# Patient Record
Sex: FEMALE | Race: WHITE | Employment: FULL TIME | URBAN - METROPOLITAN AREA
[De-identification: names, ages, dates, MRNs, and addresses within clinical notes are randomized per-mention and may not be internally consistent; named-entity substitution may affect disease eponyms.]

---

## 2021-11-19 LAB
ANTIBODY SCREEN, EXTERNAL: NEGATIVE
CHLAMYDIA, EXTERNAL: NEGATIVE
HBSAG, EXTERNAL: NON REACTIVE
HEPATITIS C AB,   EXT: NEGATIVE
HIV, EXTERNAL: NON REACTIVE
N. GONORRHEA, EXTERNAL: NEGATIVE
RUBELLA, EXTERNAL: NORMAL
T. PALLIDUM, EXTERNAL: NON REACTIVE
TYPE, ABO & RH, EXTERNAL: NORMAL

## 2022-04-06 ENCOUNTER — HOSPITAL ENCOUNTER (EMERGENCY)
Age: 32
Discharge: HOME | End: 2022-04-06
Attending: OBSTETRICS & GYNECOLOGY | Admitting: STUDENT IN AN ORGANIZED HEALTH CARE EDUCATION/TRAINING PROGRAM
Payer: COMMERCIAL

## 2022-04-06 VITALS
BODY MASS INDEX: 46.07 KG/M2 | DIASTOLIC BLOOD PRESSURE: 82 MMHG | SYSTOLIC BLOOD PRESSURE: 120 MMHG | OXYGEN SATURATION: 97 % | HEART RATE: 116 BPM | TEMPERATURE: 98.2 F | HEIGHT: 63 IN | RESPIRATION RATE: 20 BRPM | WEIGHT: 260 LBS

## 2022-04-06 LAB
A1 MICROGLOB PLACENTAL VAG QL: NEGATIVE
APPEARANCE UR: CLEAR
BACTERIA URNS QL MICRO: ABNORMAL /HPF
BILIRUB UR QL: NEGATIVE
COLOR UR: ABNORMAL
CONTROL LINE PRESENT?: NORMAL
EPITH CASTS URNS QL MICRO: ABNORMAL /LPF
EXPIRATION DATE: NORMAL
GLUCOSE UR STRIP.AUTO-MCNC: NEGATIVE MG/DL
HGB UR QL STRIP: NEGATIVE
HYALINE CASTS URNS QL MICRO: ABNORMAL /LPF (ref 0–5)
INTERNAL NEGATIVE CONTROL: NORMAL
KETONES UR QL STRIP.AUTO: NEGATIVE MG/DL
KIT LOT NO.: NORMAL
LEUKOCYTE ESTERASE UR QL STRIP.AUTO: NEGATIVE
NITRITE UR QL STRIP.AUTO: NEGATIVE
PH UR STRIP: 6 [PH] (ref 5–8)
PROT UR STRIP-MCNC: NEGATIVE MG/DL
RBC #/AREA URNS HPF: ABNORMAL /HPF (ref 0–5)
SP GR UR REFRACTOMETRY: 1.02 (ref 1–1.03)
UA: UC IF INDICATED,UAUC: ABNORMAL
UROBILINOGEN UR QL STRIP.AUTO: 0.2 EU/DL (ref 0.2–1)
WBC URNS QL MICRO: ABNORMAL /HPF (ref 0–4)

## 2022-04-06 PROCEDURE — 99285 EMERGENCY DEPT VISIT HI MDM: CPT

## 2022-04-06 PROCEDURE — 59025 FETAL NON-STRESS TEST: CPT

## 2022-04-06 PROCEDURE — 74011250636 HC RX REV CODE- 250/636: Performed by: STUDENT IN AN ORGANIZED HEALTH CARE EDUCATION/TRAINING PROGRAM

## 2022-04-06 PROCEDURE — 84112 EVAL AMNIOTIC FLUID PROTEIN: CPT | Performed by: STUDENT IN AN ORGANIZED HEALTH CARE EDUCATION/TRAINING PROGRAM

## 2022-04-06 PROCEDURE — 96360 HYDRATION IV INFUSION INIT: CPT

## 2022-04-06 PROCEDURE — 81001 URINALYSIS AUTO W/SCOPE: CPT

## 2022-04-06 RX ORDER — GUAIFENESIN 100 MG/5ML
81 LIQUID (ML) ORAL DAILY
COMMUNITY
End: 2022-05-01

## 2022-04-06 RX ORDER — PROMETHAZINE HYDROCHLORIDE 12.5 MG/1
12.5 TABLET ORAL
COMMUNITY
End: 2022-05-01

## 2022-04-06 RX ORDER — BUSPIRONE HYDROCHLORIDE 10 MG/1
5 TABLET ORAL 2 TIMES DAILY
COMMUNITY

## 2022-04-06 RX ORDER — ONDANSETRON 4 MG/1
4 TABLET, FILM COATED ORAL
COMMUNITY

## 2022-04-06 RX ORDER — LEVOTHYROXINE SODIUM 75 UG/1
75 TABLET ORAL
COMMUNITY

## 2022-04-06 RX ADMIN — SODIUM CHLORIDE, POTASSIUM CHLORIDE, SODIUM LACTATE AND CALCIUM CHLORIDE 1000 ML: 600; 310; 30; 20 INJECTION, SOLUTION INTRAVENOUS at 14:24

## 2022-04-06 NOTE — PROGRESS NOTES
Pt arrived from home with c/o of contractions starting around 11am; states she has some leaking but is unsure if it is urine or bag of water; allergy to keflex, tramadol, penicillin, sulfa, bactrim; G1; [facility] 5/17/2022

## 2022-04-06 NOTE — DISCHARGE INSTRUCTIONS
Patient Education   Patient Education        Weeks 34 to 39 of Your Pregnancy: Care Instructions  Overview     By now, your baby and your belly have grown quite large. It's almost time to give birth! Your baby's lungs are almost ready to breathe air. The skull bones are firm enough to protect your baby's head, but soft enough to move down through the birth canal.  You may be feeling excited and happy at times--but also anxious or scared. You might wonder how you'll know if you're in labor or what to expect during labor. Try to be open and flexible in your expectations of the birth. Because each birth is different, there's no way to know exactly what childbirth will be like for you. Talk to your doctor or midwife about any concerns you have. If you haven't already had the Tdap shot during this pregnancy, talk to your doctor about getting it. It will help protect your  against pertussis infection. In the 36th week, you'll probably have a test for group B streptococcus (GBS). GBS is a common type of bacteria that can live in the vagina and rectum. It can make your baby sick after birth. If you test positive, you will get antibiotics during labor. The medicine will help keep your baby from getting the bacteria. Follow-up care is a key part of your treatment and safety. Be sure to make and go to all appointments, and call your doctor if you are having problems. It's also a good idea to know your test results and keep a list of the medicines you take. How can you care for yourself at home? Learn about pain relief choices  · Pain is different for everyone. Talk with your doctor about your feelings about pain. · You can choose from several types of pain relief. These include medicine, breathing techniques, and comfort measures. You can use more than one option. · If you choose to have pain medicine during labor, talk to your doctor about your options.  Some medicines lower anxiety and help with some of the pain. Others make your lower body numb so that you won't feel pain. · Be sure to tell your doctor about your pain medicine choice before you start labor or very early in your labor. You may be able to change your mind as labor progresses. Labor and delivery  · The first stage of labor has three parts: early, active, and transition. ? It's common to have early labor at home. You can stay busy or rest, eat light snacks, drink clear fluids, and start counting contractions. ? When talking during a contraction gets hard, you may be moving to active labor. During active labor, you should head for the hospital if you aren't there already. ? You are in active labor when contractions come every 3 to 4 minutes and last about 60 seconds. Your cervix is opening more rapidly. ? If your water breaks, contractions will come faster and stronger. ? During transition, your cervix is stretching, and contractions are coming more rapidly. ? You may want to push, but your cervix might not be ready. Your doctor will tell you when to push. · The second stage starts when your cervix is completely opened and you are ready to push. ? Contractions are very strong to push the baby down the birth canal.  ? You will probably feel the urge to push. You may feel like you need to have a bowel movement. ? You may be coached to push with contractions. These contractions will be very strong, but you won't have them as often. You can get a little rest between contractions. ? One last push, and your baby is born. · The third stage is when a few more contractions push out the placenta. This may take 30 minutes or less. Where can you learn more? Go to http://www.gray.com/  Enter B912 in the search box to learn more about \"Weeks 34 to 36 of Your Pregnancy: Care Instructions. \"  Current as of: June 16, 2021               Content Version: 13.2  © 7329-0112 Healthwise, Incorporated.    Care instructions adapted under license by [employer] (which disclaims liability or warranty for this information). If you have questions about a medical condition or this instruction, always ask your healthcare professional. [address] any warranty or liability for your use of this information. Li Peters Contractions: Care Instructions  Your Care Instructions     Natrona Mcdaniel contractions prepare your uterus for labor. Think of them as a \"warm-up\" exercise that your body does. You may begin to feel them between the 28th and 30th weeks of your pregnancy. But they start as early as the 20th week. Natrona Mcdaniel contractions usually occur more often during the ninth month. They may go away when you are active and return when you rest. These contractions are like mild contractions of true labor, but they occur less often. (You feel fewer than 8 in an hour.) They don't cause your cervix to open. It may be hard for you to tell the difference between Jacelyn [place] contractions and true labor, especially in your first pregnancy. Follow-up care is a key part of your treatment and safety. Be sure to make and go to all appointments, and call your doctor if you are having problems. It's also a good idea to know your test results and keep a list of the medicines you take. How can you care for yourself at home? · Try a warm bath to help relieve muscle tension and reduce pain. · Change positions every 30 minutes. Take breaks if you must sit for a long time. Get up and walk around. · Drink plenty of water. · Taking short walks may help you feel better. Your doctor needs to check any contractions that are getting stronger or closer together. Where can you learn more? Go to http://www.gray.com/  Enter Z402 in the search box to learn more about \"James Mcdaniel Contractions: Care Instructions. \"  Current as of: June 16, 2021               Content Version: 13.2  © 3336-5124 [employer]. Care instructions adapted under license by [employer] (which disclaims liability or warranty for this information). If you have questions about a medical condition or this instruction, always ask your healthcare professional. [address] any warranty or liability for your use of this information.

## 2022-04-06 NOTE — PROGRESS NOTES
1404: Dr. Serina Hudson at bedside. Strip reviewed. SVE performed- closed/thick/high. Plan to PO/IV hydrate and send UA. BP elevated 150/70, - appears anxiety related. Will continue to monitor. Amnisure negative. 1415: 128/79, .    1525: UA reviewed, OK to discharge home per Dr. Serina Hudson. 1540: I have reviewed discharge instructions with the patient. The patient verbalized understanding. Pt to discharge in NAD with AVS and education folder in hand.
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
## 2022-04-22 ENCOUNTER — HOSPITAL ENCOUNTER (OUTPATIENT)
Age: 32
Setting detail: OBSERVATION
Discharge: HOME | End: 2022-04-24
Attending: OBSTETRICS & GYNECOLOGY | Admitting: OBSTETRICS & GYNECOLOGY
Payer: OTHER GOVERNMENT

## 2022-04-22 PROBLEM — O16.9 HYPERTENSION AFFECTING PREGNANCY: Status: ACTIVE | Noted: 2022-04-22

## 2022-04-22 LAB
ALBUMIN SERPL-MCNC: 2.6 G/DL (ref 3.5–5)
ALBUMIN/GLOB SERPL: 0.6 {RATIO} (ref 1.1–2.2)
ALP SERPL-CCNC: 120 U/L (ref 45–117)
ALT SERPL-CCNC: 13 U/L (ref 12–78)
ANION GAP SERPL CALC-SCNC: 8 MMOL/L (ref 5–15)
APPEARANCE UR: CLEAR
AST SERPL-CCNC: 10 U/L (ref 15–37)
BASOPHILS # BLD: 0 K/UL (ref 0–0.1)
BASOPHILS NFR BLD: 0 % (ref 0–1)
BILIRUB SERPL-MCNC: 0.3 MG/DL (ref 0.2–1)
BILIRUB UR QL: NEGATIVE
BUN SERPL-MCNC: 7 MG/DL (ref 6–20)
BUN/CREAT SERPL: 13 (ref 12–20)
CALCIUM SERPL-MCNC: 9.5 MG/DL (ref 8.5–10.1)
CHLORIDE SERPL-SCNC: 106 MMOL/L (ref 97–108)
CO2 SERPL-SCNC: 23 MMOL/L (ref 21–32)
COLOR UR: NORMAL
CREAT SERPL-MCNC: 0.56 MG/DL (ref 0.55–1.02)
CREAT UR-MCNC: 35.2 MG/DL
DIFFERENTIAL METHOD BLD: ABNORMAL
EOSINOPHIL # BLD: 0 K/UL (ref 0–0.4)
EOSINOPHIL NFR BLD: 0 % (ref 0–7)
ERYTHROCYTE [DISTWIDTH] IN BLOOD BY AUTOMATED COUNT: 13.9 % (ref 11.5–14.5)
GLOBULIN SER CALC-MCNC: 4.4 G/DL (ref 2–4)
GLUCOSE SERPL-MCNC: 83 MG/DL (ref 65–100)
GLUCOSE UR STRIP.AUTO-MCNC: NEGATIVE MG/DL
GRBS, EXTERNAL: NEGATIVE
HCT VFR BLD AUTO: 35.7 % (ref 35–47)
HGB BLD-MCNC: 11.5 G/DL (ref 11.5–16)
HGB UR QL STRIP: NEGATIVE
IMM GRANULOCYTES # BLD AUTO: 0.1 K/UL (ref 0–0.04)
IMM GRANULOCYTES NFR BLD AUTO: 1 % (ref 0–0.5)
KETONES UR QL STRIP.AUTO: NEGATIVE MG/DL
LEUKOCYTE ESTERASE UR QL STRIP.AUTO: NEGATIVE
LYMPHOCYTES # BLD: 1.7 K/UL (ref 0.8–3.5)
LYMPHOCYTES NFR BLD: 11 % (ref 12–49)
MCH RBC QN AUTO: 25.2 PG (ref 26–34)
MCHC RBC AUTO-ENTMCNC: 32.2 G/DL (ref 30–36.5)
MCV RBC AUTO: 78.3 FL (ref 80–99)
MONOCYTES # BLD: 1.1 K/UL (ref 0–1)
MONOCYTES NFR BLD: 7 % (ref 5–13)
NEUTS SEG # BLD: 12.6 K/UL (ref 1.8–8)
NEUTS SEG NFR BLD: 81 % (ref 32–75)
NITRITE UR QL STRIP.AUTO: NEGATIVE
NRBC # BLD: 0 K/UL (ref 0–0.01)
NRBC BLD-RTO: 0 PER 100 WBC
PH UR STRIP: 7 [PH] (ref 5–8)
PLATELET # BLD AUTO: 367 K/UL (ref 150–400)
PMV BLD AUTO: 10.4 FL (ref 8.9–12.9)
POTASSIUM SERPL-SCNC: 3.9 MMOL/L (ref 3.5–5.1)
PROT SERPL-MCNC: 7 G/DL (ref 6.4–8.2)
PROT UR STRIP-MCNC: NEGATIVE MG/DL
PROT UR-MCNC: 11 MG/DL (ref 0–11.9)
PROT/CREAT UR-RTO: 0.3
RBC # BLD AUTO: 4.56 M/UL (ref 3.8–5.2)
SODIUM SERPL-SCNC: 137 MMOL/L (ref 136–145)
SP GR UR REFRACTOMETRY: 1.01 (ref 1–1.03)
UROBILINOGEN UR QL STRIP.AUTO: 0.2 EU/DL (ref 0.2–1)
WBC # BLD AUTO: 15.6 K/UL (ref 3.6–11)

## 2022-04-22 PROCEDURE — G0378 HOSPITAL OBSERVATION PER HR: HCPCS

## 2022-04-22 PROCEDURE — 74011250636 HC RX REV CODE- 250/636: Performed by: OBSTETRICS & GYNECOLOGY

## 2022-04-22 PROCEDURE — 74011250637 HC RX REV CODE- 250/637: Performed by: OBSTETRICS & GYNECOLOGY

## 2022-04-22 PROCEDURE — 84156 ASSAY OF PROTEIN URINE: CPT

## 2022-04-22 PROCEDURE — 96361 HYDRATE IV INFUSION ADD-ON: CPT

## 2022-04-22 PROCEDURE — 85025 COMPLETE CBC W/AUTO DIFF WBC: CPT

## 2022-04-22 PROCEDURE — 96360 HYDRATION IV INFUSION INIT: CPT

## 2022-04-22 PROCEDURE — 80053 COMPREHEN METABOLIC PANEL: CPT

## 2022-04-22 PROCEDURE — 99285 EMERGENCY DEPT VISIT HI MDM: CPT

## 2022-04-22 PROCEDURE — 74011250637 HC RX REV CODE- 250/637

## 2022-04-22 PROCEDURE — 65410000002 HC RM PRIVATE OB

## 2022-04-22 PROCEDURE — 36415 COLL VENOUS BLD VENIPUNCTURE: CPT

## 2022-04-22 PROCEDURE — 81003 URINALYSIS AUTO W/O SCOPE: CPT

## 2022-04-22 RX ORDER — ACETAMINOPHEN 325 MG/1
TABLET ORAL
Status: COMPLETED
Start: 2022-04-22 | End: 2022-04-22

## 2022-04-22 RX ORDER — ONDANSETRON 4 MG/1
4 TABLET, FILM COATED ORAL
Status: DISCONTINUED | OUTPATIENT
Start: 2022-04-22 | End: 2022-04-24 | Stop reason: HOSPADM

## 2022-04-22 RX ORDER — FOLIC ACID/MULTIVIT,IRON,MINER 0.4MG-18MG
1 TABLET ORAL DAILY
Status: DISCONTINUED | OUTPATIENT
Start: 2022-04-23 | End: 2022-04-24 | Stop reason: HOSPADM

## 2022-04-22 RX ORDER — BUSPIRONE HYDROCHLORIDE 5 MG/1
5 TABLET ORAL 2 TIMES DAILY
Status: DISCONTINUED | OUTPATIENT
Start: 2022-04-22 | End: 2022-04-24 | Stop reason: HOSPADM

## 2022-04-22 RX ORDER — ACETAMINOPHEN 325 MG/1
650 TABLET ORAL
Status: DISCONTINUED | OUTPATIENT
Start: 2022-04-22 | End: 2022-04-24 | Stop reason: HOSPADM

## 2022-04-22 RX ORDER — PROMETHAZINE HYDROCHLORIDE 25 MG/1
12.5 TABLET ORAL
Status: DISCONTINUED | OUTPATIENT
Start: 2022-04-22 | End: 2022-04-24 | Stop reason: HOSPADM

## 2022-04-22 RX ORDER — SODIUM CHLORIDE, SODIUM LACTATE, POTASSIUM CHLORIDE, CALCIUM CHLORIDE 600; 310; 30; 20 MG/100ML; MG/100ML; MG/100ML; MG/100ML
125 INJECTION, SOLUTION INTRAVENOUS CONTINUOUS
Status: DISCONTINUED | OUTPATIENT
Start: 2022-04-22 | End: 2022-04-24 | Stop reason: HOSPADM

## 2022-04-22 RX ADMIN — SODIUM CHLORIDE, POTASSIUM CHLORIDE, SODIUM LACTATE AND CALCIUM CHLORIDE 125 ML/HR: 600; 310; 30; 20 INJECTION, SOLUTION INTRAVENOUS at 14:04

## 2022-04-22 RX ADMIN — ACETAMINOPHEN 325MG 650 MG: 325 TABLET ORAL at 12:48

## 2022-04-22 RX ADMIN — ACETAMINOPHEN 325MG 650 MG: 325 TABLET ORAL at 18:32

## 2022-04-22 RX ADMIN — BUSPIRONE HYDROCHLORIDE 5 MG: 5 TABLET ORAL at 18:32

## 2022-04-22 RX ADMIN — SODIUM CHLORIDE, POTASSIUM CHLORIDE, SODIUM LACTATE AND CALCIUM CHLORIDE 125 ML/HR: 600; 310; 30; 20 INJECTION, SOLUTION INTRAVENOUS at 21:52

## 2022-04-22 RX ADMIN — SODIUM CHLORIDE, POTASSIUM CHLORIDE, SODIUM LACTATE AND CALCIUM CHLORIDE 1000 ML: 600; 310; 30; 20 INJECTION, SOLUTION INTRAVENOUS at 12:49

## 2022-04-22 NOTE — H&P
History & Physical    Name: Elieser Arias MRN: [id number]  SSN: [id number]    YOB: 1990  Age: 36 y.o. Sex: female      Subjective:     Reason for Admission:  Pregnancy and Hypertension    History of Present Illness: Ms. Gerald Abebe is a 36 y.o.  female with an estimated gestational age of 43w3d with Estimated Date of Delivery: 22. Patient presented to Dr. Diaz [facility] office this morning with complaints of BH contractions. She was noted to have a /100. US- vertex, BPP 6/8- 2 off for no fetal breathing, DANIEL=5.2. She had a mild headache this morning. That has since resolved. She denies RUB or abdominal pain. She reports active FM. She was sent to L&D to rule out pre-eclampsia. Pregnancy c/b:  1. FETAL LEFT PELVIC KIDNEY- plan for u/s after delivery,, torch (-)     2. Fundal accessory lobe seen at 20w    3. Asthma- med infrequently used    4. Depressive disorder-  better w/ txing her hypothyroidism, would like to start sertraline immediately after delivery __    5. Bipolar on Buspar    6. Severely obese- BMI: 40.4,, early glucola nL,, asa 81mg _x_ ,, FS w/ mfm w/ echo ht. poorly seen: pediatric cardiologist ECHO: nL pwer patient,, GSq4w ___,, nst/bpp wkly 34wk ___,, consider delvery by 40 wks ___    7. Hypothyroidism  (controlled by PCP, dosage incr. 2 months prior to intial ob visit),, 1st T nL,, 2nd T nL,, 3rdT nL    OB History    Para Term  AB Living   2       1 0   SAB IAB Ectopic Molar Multiple Live Births   1                # Outcome Date GA Lbr Enrique/2nd Weight Sex Delivery Anes PTL Lv   2 Current            1 SAB 21             Past Medical History:   Diagnosis Date    Anxiety     Asthma     [facility] disorder ([facility] [address]     Depression     Hypertension affecting pregnancy 2022    Hypothyroidism     IBS (irritable bowel syndrome)     Immunosuppression ([facility] [address]      No past surgical history on file.   Social History     Occupational History    Not on file Tobacco Use    Smoking status: Former Smoker    Smokeless tobacco: Not on file   Substance and Sexual Activity    Alcohol use: No    Drug use: No    Sexual activity: Never      No family history on file. Allergies   Allergen Reactions    Keflex [Cephalexin] Anaphylaxis    Pcn [Penicillins] Anaphylaxis    Sulfa (Sulfonamide Antibiotics) Anaphylaxis    Tramadol Rash     Prior to Admission medications    Medication Sig Start Date End Date Taking? Authorizing Provider   busPIRone (BUSPAR) 10 mg tablet Take 5 mg by mouth two (2) times a day. Yes Provider, Historical   ondansetron hcl (Zofran) 4 mg tablet Take 4 mg by mouth every eight (8) hours as needed for Nausea or Vomiting. Yes Provider, Historical   promethazine (PHENERGAN) 12.5 mg tablet Take 12.5 mg by mouth every six (6) hours as needed for Nausea. Yes Provider, Historical   levothyroxine (Synthroid) 75 mcg tablet Take 75 mcg by mouth Daily (before breakfast). Yes Provider, Historical   PNV Comb #2-Iron-FA-Omega 3 29-1-400 mg cmpk Take  by mouth. Yes Provider, Historical   aspirin 81 mg chewable tablet Take 81 mg by mouth daily. Yes Provider, Historical   PARoxetine (PAXIL) 40 mg tablet Take 1 Tab by mouth nightly. Indications: ANXIETY WITH DEPRESSION  Patient not taking: Reported on 2022 12/25/15   Mitch Antonio MD   QUEtiapine (SEROQUEL) 50 mg tablet Take 1 Tab by mouth three (3) times daily. Indications: DEPRESSION TREATMENT ADJUNCT  Patient not taking: Reported on 2022 12/25/15   Mitch Antonio MD        Review of Systems:  A comprehensive review of systems was negative except for that written in the History of Present Illness.      Objective:     Vitals:    Vitals:    22 1211 22 1214 22 1216 22 1221   BP:  129/82     Pulse:  97     Resp:       Temp:       SpO2: 98%  99% 96%   Weight:       Height:          Temp (24hrs), Av.6 °F (37 °C), Min:98.6 °F (37 °C), Max:98.6 °F (37 °C)    BP  Min: 112/73 Max: 137/85     Physical Exam:  Patient without distress. Heart: Regular rate and rhythm  Lung: clear to auscultation throughout lung fields, no wheezes, no rales, no rhonchi and normal respiratory effort  Abdomen- fundus non tender  Membranes:  Intact  Uterine Activity:  None  Fetal Heart Rate:  Reactive       Lab/Data Review:  Recent Results (from the past 24 hour(s))   CBC WITH AUTOMATED DIFF    Collection Time: 04/22/22 10:57 AM   Result Value Ref Range    WBC 15.6 (H) 3.6 - 11.0 K/uL    RBC 4.56 3.80 - 5.20 M/uL    HGB 11.5 11.5 - 16.0 g/dL    HCT 35.7 35.0 - 47.0 %    MCV 78.3 (L) 80.0 - 99.0 FL    MCH 25.2 (L) 26.0 - 34.0 PG    MCHC 32.2 30.0 - 36.5 g/dL    RDW 13.9 11.5 - 14.5 %    PLATELET 893 918 - 235 K/uL    MPV 10.4 8.9 - 12.9 FL    NRBC 0.0 0  WBC    ABSOLUTE NRBC 0.00 0.00 - 0.01 K/uL    NEUTROPHILS 81 (H) 32 - 75 %    LYMPHOCYTES 11 (L) 12 - 49 %    MONOCYTES 7 5 - 13 %    EOSINOPHILS 0 0 - 7 %    BASOPHILS 0 0 - 1 %    IMMATURE GRANULOCYTES 1 (H) 0.0 - 0.5 %    ABS. NEUTROPHILS 12.6 (H) 1.8 - 8.0 K/UL    ABS. LYMPHOCYTES 1.7 0.8 - 3.5 K/UL    ABS. MONOCYTES 1.1 (H) 0.0 - 1.0 K/UL    ABS. EOSINOPHILS 0.0 0.0 - 0.4 K/UL    ABS. BASOPHILS 0.0 0.0 - 0.1 K/UL    ABS. IMM. GRANS. 0.1 (H) 0.00 - 0.04 K/UL    DF AUTOMATED     METABOLIC PANEL, COMPREHENSIVE    Collection Time: 04/22/22 10:57 AM   Result Value Ref Range    Sodium 137 136 - 145 mmol/L    Potassium 3.9 3.5 - 5.1 mmol/L    Chloride 106 97 - 108 mmol/L    CO2 23 21 - 32 mmol/L    Anion gap 8 5 - 15 mmol/L    Glucose 83 65 - 100 mg/dL    BUN 7 6 - 20 MG/DL    Creatinine 0.56 0.55 - 1.02 MG/DL    BUN/Creatinine ratio 13 12 - 20      GFR est AA >60 >60 ml/min/1.73m2    GFR est non-AA >60 >60 ml/min/1.73m2    Calcium 9.5 8.5 - 10.1 MG/DL    Bilirubin, total 0.3 0.2 - 1.0 MG/DL    ALT (SGPT) 13 12 - 78 U/L    AST (SGOT) 10 (L) 15 - 37 U/L    Alk.  phosphatase 120 (H) 45 - 117 U/L    Protein, total 7.0 6.4 - 8.2 g/dL    Albumin 2.6 (L) 3.5 - 5.0 g/dL    Globulin 4.4 (H) 2.0 - 4.0 g/dL    A-G Ratio 0.6 (L) 1.1 - 2.2     URINALYSIS W/ RFLX MICROSCOPIC    Collection Time: 04/22/22 10:58 AM   Result Value Ref Range    Color YELLOW/STRAW      Appearance CLEAR CLEAR      Specific gravity 1.009 1.003 - 1.030      pH (UA) 7.0 5.0 - 8.0      Protein Negative NEG mg/dL    Glucose Negative NEG mg/dL    Ketone Negative NEG mg/dL    Bilirubin Negative NEG      Blood Negative NEG      Urobilinogen 0.2 0.2 - 1.0 EU/dL    Nitrites Negative NEG      Leukocyte Esterase Negative NEG     PROTEIN/CREATININE RATIO, URINE    Collection Time: 04/22/22 11:50 AM   Result Value Ref Range    Protein, urine random 11 0.0 - 11.9 mg/dL    Creatinine, urine 35.20 mg/dL    Protein/Creat. urine Ratio 0.3         Assessment and Plan: Active Problems:    Hypertension affecting pregnancy (4/22/2022)     G1 at 36 3/7 wks with pre-eclampsia without severe features based on UPC ratio of 0.3. She has normal labs and has had normal BPs on L&D. Reactive NST with BPP 8/10 (-2 for no breathing). Borderline low fluid with DANIEL=5.2  -admit to L&D for serial BPs   -TID NSTs  -IVFs for low DANIEL with plan to repeat DANIEL in 48 hours  -Would proceed with induction for any indications of severe features.  If she remains without severe features would plan induction at 37 weeks  -Hypothyroid- continue Synthroid  -Bipolar- continue buspar  -Fetal pelvic kidney- notify peds at deliver  -Accessory lobe of placenta- noted  -Depression- would like to start on Zoloft PP

## 2022-04-23 PROCEDURE — 74011250636 HC RX REV CODE- 250/636: Performed by: OBSTETRICS & GYNECOLOGY

## 2022-04-23 PROCEDURE — 96361 HYDRATE IV INFUSION ADD-ON: CPT

## 2022-04-23 PROCEDURE — 74011250637 HC RX REV CODE- 250/637: Performed by: OBSTETRICS & GYNECOLOGY

## 2022-04-23 PROCEDURE — 65410000002 HC RM PRIVATE OB

## 2022-04-23 PROCEDURE — G0378 HOSPITAL OBSERVATION PER HR: HCPCS

## 2022-04-23 RX ADMIN — SODIUM CHLORIDE, POTASSIUM CHLORIDE, SODIUM LACTATE AND CALCIUM CHLORIDE 125 ML/HR: 600; 310; 30; 20 INJECTION, SOLUTION INTRAVENOUS at 22:21

## 2022-04-23 RX ADMIN — Medication 1 TABLET: at 08:35

## 2022-04-23 RX ADMIN — BUSPIRONE HYDROCHLORIDE 5 MG: 5 TABLET ORAL at 08:35

## 2022-04-23 RX ADMIN — SODIUM CHLORIDE, POTASSIUM CHLORIDE, SODIUM LACTATE AND CALCIUM CHLORIDE 125 ML/HR: 600; 310; 30; 20 INJECTION, SOLUTION INTRAVENOUS at 14:15

## 2022-04-23 RX ADMIN — SODIUM CHLORIDE, POTASSIUM CHLORIDE, SODIUM LACTATE AND CALCIUM CHLORIDE 125 ML/HR: 600; 310; 30; 20 INJECTION, SOLUTION INTRAVENOUS at 05:52

## 2022-04-23 RX ADMIN — LEVOTHYROXINE SODIUM 75 MCG: 0.05 TABLET ORAL at 05:52

## 2022-04-23 RX ADMIN — BUSPIRONE HYDROCHLORIDE 5 MG: 5 TABLET ORAL at 22:22

## 2022-04-23 NOTE — ROUTINE PROCESS
1930 Bedside shift change report given to DION Nowak Current RN (oncoming nurse) by Cathy Weaver. [facility] (offgoing nurse). Report included the following information SBAR, Kardex, Intake/Output and MAR.

## 2022-04-23 NOTE — ROUTINE PROCESS
Bedside and Verbal shift change report given to BÁRBARA Cade RN  (oncoming nurse) by JUSTIN Jung (offgoing nurse). Report included the following information SBAR, Kardex, Procedure Summary, Intake/Output, MAR and Recent Results.

## 2022-04-23 NOTE — PROGRESS NOTES
Ante Partum Progress Note    Emmanuel Galvan  [id number]    Assessment: 36w4d   Pre-eclampsia without severe features based on UPC ratio=0.3, normal labs and BPs in hospital  Borderline low fluid with DANIEL=5.2 yesterday    Plan:  Continue hospitalization with hospitalized bedrest  Pre-E without severe features- would proceed with delivery for any severe features, otherwise plan 37 week induction, continue to monitor BPs, repeat labs tomorrow  Borderline low fluid- IVFs, repeat DANIEL tomorrow  Bipolar- continue Buspar  Hypothyroid- continue Synthroid    Orders/Charges: High    Patient states she does not have headache , abdominal pain  , contractions, right upper quadrant pain  , vaginal bleeding , swelling, vaginal leaking of fluid  and reports active FM    Vitals:  Visit Vitals  /86 (BP Patient Position: At rest)   Pulse 95   Temp 97.8 °F (36.6 °C)   Resp 18   Ht 5' 3\" (1.6 m)   Wt 121.6 kg (268 lb)   SpO2 100%   BMI 47.47 kg/m²     Temp (24hrs), Av °F (36.7 °C), Min:97.5 °F (36.4 °C), Max:98.6 °F (37 °C)      Last 24hr Input/Output:  No intake or output data in the 24 hours ending 22 1027     Non stress test:  Reactive    Patient Vitals for the past 4 hrs: Mode Fetal Heart Rate Fetal Activity Variability Decelerations Accelerations RN Reviewed Strip? Non Stress Test   22 0940 External 135 Present 6-25 BPM None Yes Yes Reactive      Patient Vitals for the past 4 hrs: Mode Fetal Heart Rate Fetal Activity Variability Decelerations Accelerations RN Reviewed Strip? Non Stress Test   22 0940 External 135 Present 6-25 BPM None Yes Yes Reactive        Uterine Activity: None     Exam:  Patient without distress.      Abdomen, fundus soft non-tender     Extremities, no redness or tenderness               Additional Exam: Deferred    Labs:     Lab Results   Component Value Date/Time    WBC 15.6 (H) 2022 10:57 AM    WBC 9.1 2015 05:40 PM    HGB 11.5 2022 10:57 AM    HGB 14.6 2015 05:40 PM    HCT 35.7 04/22/2022 10:57 AM    HCT 42.9 12/21/2015 05:40 PM    PLATELET 457 63/84/9213 10:57 AM    PLATELET 063 05/27/6494 05:40 PM       Recent Results (from the past 24 hour(s))   CBC WITH AUTOMATED DIFF    Collection Time: 04/22/22 10:57 AM   Result Value Ref Range    WBC 15.6 (H) 3.6 - 11.0 K/uL    RBC 4.56 3.80 - 5.20 M/uL    HGB 11.5 11.5 - 16.0 g/dL    HCT 35.7 35.0 - 47.0 %    MCV 78.3 (L) 80.0 - 99.0 FL    MCH 25.2 (L) 26.0 - 34.0 PG    MCHC 32.2 30.0 - 36.5 g/dL    RDW 13.9 11.5 - 14.5 %    PLATELET 811 570 - 489 K/uL    MPV 10.4 8.9 - 12.9 FL    NRBC 0.0 0  WBC    ABSOLUTE NRBC 0.00 0.00 - 0.01 K/uL    NEUTROPHILS 81 (H) 32 - 75 %    LYMPHOCYTES 11 (L) 12 - 49 %    MONOCYTES 7 5 - 13 %    EOSINOPHILS 0 0 - 7 %    BASOPHILS 0 0 - 1 %    IMMATURE GRANULOCYTES 1 (H) 0.0 - 0.5 %    ABS. NEUTROPHILS 12.6 (H) 1.8 - 8.0 K/UL    ABS. LYMPHOCYTES 1.7 0.8 - 3.5 K/UL    ABS. MONOCYTES 1.1 (H) 0.0 - 1.0 K/UL    ABS. EOSINOPHILS 0.0 0.0 - 0.4 K/UL    ABS. BASOPHILS 0.0 0.0 - 0.1 K/UL    ABS. IMM. GRANS. 0.1 (H) 0.00 - 0.04 K/UL    DF AUTOMATED     METABOLIC PANEL, COMPREHENSIVE    Collection Time: 04/22/22 10:57 AM   Result Value Ref Range    Sodium 137 136 - 145 mmol/L    Potassium 3.9 3.5 - 5.1 mmol/L    Chloride 106 97 - 108 mmol/L    CO2 23 21 - 32 mmol/L    Anion gap 8 5 - 15 mmol/L    Glucose 83 65 - 100 mg/dL    BUN 7 6 - 20 MG/DL    Creatinine 0.56 0.55 - 1.02 MG/DL    BUN/Creatinine ratio 13 12 - 20      GFR est AA >60 >60 ml/min/1.73m2    GFR est non-AA >60 >60 ml/min/1.73m2    Calcium 9.5 8.5 - 10.1 MG/DL    Bilirubin, total 0.3 0.2 - 1.0 MG/DL    ALT (SGPT) 13 12 - 78 U/L    AST (SGOT) 10 (L) 15 - 37 U/L    Alk.  phosphatase 120 (H) 45 - 117 U/L    Protein, total 7.0 6.4 - 8.2 g/dL    Albumin 2.6 (L) 3.5 - 5.0 g/dL    Globulin 4.4 (H) 2.0 - 4.0 g/dL    A-G Ratio 0.6 (L) 1.1 - 2.2     URINALYSIS W/ RFLX MICROSCOPIC    Collection Time: 04/22/22 10:58 AM   Result Value Ref Range    Color YELLOW/STRAW      Appearance CLEAR CLEAR      Specific gravity 1.009 1.003 - 1.030      pH (UA) 7.0 5.0 - 8.0      Protein Negative NEG mg/dL    Glucose Negative NEG mg/dL    Ketone Negative NEG mg/dL    Bilirubin Negative NEG      Blood Negative NEG      Urobilinogen 0.2 0.2 - 1.0 EU/dL    Nitrites Negative NEG      Leukocyte Esterase Negative NEG     PROTEIN/CREATININE RATIO, URINE    Collection Time: 04/22/22 11:50 AM   Result Value Ref Range    Protein, urine random 11 0.0 - 11.9 mg/dL    Creatinine, urine 35.20 mg/dL    Protein/Creat.  urine Ratio 0.3

## 2022-04-24 VITALS
RESPIRATION RATE: 17 BRPM | WEIGHT: 268 LBS | HEIGHT: 63 IN | TEMPERATURE: 97.9 F | SYSTOLIC BLOOD PRESSURE: 122 MMHG | HEART RATE: 93 BPM | BODY MASS INDEX: 47.48 KG/M2 | DIASTOLIC BLOOD PRESSURE: 78 MMHG | OXYGEN SATURATION: 100 %

## 2022-04-24 LAB
ALBUMIN SERPL-MCNC: 2.1 G/DL (ref 3.5–5)
ALBUMIN/GLOB SERPL: 0.6 {RATIO} (ref 1.1–2.2)
ALP SERPL-CCNC: 96 U/L (ref 45–117)
ALT SERPL-CCNC: 12 U/L (ref 12–78)
ANION GAP SERPL CALC-SCNC: 9 MMOL/L (ref 5–15)
AST SERPL-CCNC: 10 U/L (ref 15–37)
BILIRUB SERPL-MCNC: 0.2 MG/DL (ref 0.2–1)
BUN SERPL-MCNC: 8 MG/DL (ref 6–20)
BUN/CREAT SERPL: 13 (ref 12–20)
CALCIUM SERPL-MCNC: 8.5 MG/DL (ref 8.5–10.1)
CHLORIDE SERPL-SCNC: 109 MMOL/L (ref 97–108)
CO2 SERPL-SCNC: 21 MMOL/L (ref 21–32)
CREAT SERPL-MCNC: 0.61 MG/DL (ref 0.55–1.02)
ERYTHROCYTE [DISTWIDTH] IN BLOOD BY AUTOMATED COUNT: 14 % (ref 11.5–14.5)
GLOBULIN SER CALC-MCNC: 3.8 G/DL (ref 2–4)
GLUCOSE SERPL-MCNC: 90 MG/DL (ref 65–100)
HCT VFR BLD AUTO: 32 % (ref 35–47)
HGB BLD-MCNC: 10.3 G/DL (ref 11.5–16)
MCH RBC QN AUTO: 25.5 PG (ref 26–34)
MCHC RBC AUTO-ENTMCNC: 32.2 G/DL (ref 30–36.5)
MCV RBC AUTO: 79.2 FL (ref 80–99)
NRBC # BLD: 0 K/UL (ref 0–0.01)
NRBC BLD-RTO: 0 PER 100 WBC
PLATELET # BLD AUTO: 291 K/UL (ref 150–400)
PMV BLD AUTO: 10.3 FL (ref 8.9–12.9)
POTASSIUM SERPL-SCNC: 3.8 MMOL/L (ref 3.5–5.1)
PROT SERPL-MCNC: 5.9 G/DL (ref 6.4–8.2)
RBC # BLD AUTO: 4.04 M/UL (ref 3.8–5.2)
SODIUM SERPL-SCNC: 139 MMOL/L (ref 136–145)
WBC # BLD AUTO: 12.2 K/UL (ref 3.6–11)

## 2022-04-24 PROCEDURE — G0378 HOSPITAL OBSERVATION PER HR: HCPCS

## 2022-04-24 PROCEDURE — 74011250636 HC RX REV CODE- 250/636: Performed by: OBSTETRICS & GYNECOLOGY

## 2022-04-24 PROCEDURE — 74011250637 HC RX REV CODE- 250/637: Performed by: OBSTETRICS & GYNECOLOGY

## 2022-04-24 PROCEDURE — 85027 COMPLETE CBC AUTOMATED: CPT

## 2022-04-24 PROCEDURE — 80053 COMPREHEN METABOLIC PANEL: CPT

## 2022-04-24 PROCEDURE — 96361 HYDRATE IV INFUSION ADD-ON: CPT

## 2022-04-24 PROCEDURE — 36415 COLL VENOUS BLD VENIPUNCTURE: CPT

## 2022-04-24 RX ADMIN — BUSPIRONE HYDROCHLORIDE 5 MG: 5 TABLET ORAL at 08:47

## 2022-04-24 RX ADMIN — SODIUM CHLORIDE, POTASSIUM CHLORIDE, SODIUM LACTATE AND CALCIUM CHLORIDE 125 ML/HR: 600; 310; 30; 20 INJECTION, SOLUTION INTRAVENOUS at 06:02

## 2022-04-24 RX ADMIN — Medication 1 TABLET: at 08:47

## 2022-04-24 RX ADMIN — LEVOTHYROXINE SODIUM 75 MCG: 0.05 TABLET ORAL at 06:06

## 2022-04-24 NOTE — PROGRESS NOTES
Ante Partum Progress Note    Leo Cantu  [id number]    Assessment: 36w5d   Pre-eclampsia without severe features based on UPC ratio=0.3, normal labs and normal to mild range BPs  Borderline low fluid on admission with DANIEL=5.2 , s/p IVFs and repeat DANIEL today=10, BPP 10/10    Plan:  Discharge home with the following: Activity: ad basilia Diet: as tolerated. -Follow up in 2 days for cervical ripening at 37 wks given pre-eclampsia, she will call with any H/As/vision changes/abdominal pain or elevated BPs at home  Bipolar- continue Buspar  Hypothyroid- continue Synthroid  Orders/Charges: High    Patient states she does not have headache , abdominal pain  , contractions, right upper quadrant pain  , vaginal bleeding , swelling, vaginal leaking of fluid  and reports active FM  She denies headaches or vision changes  Vitals:  Visit Vitals  /78   Pulse 93   Temp 97.9 °F (36.6 °C)   Resp 17   Ht 5' 3\" (1.6 m)   Wt 121.6 kg (268 lb)   SpO2 100%   BMI 47.47 kg/m²     Temp (24hrs), Av.9 °F (36.6 °C), Min:97.8 °F (36.6 °C), Max:97.9 °F (36.6 °C)      Last 24hr Input/Output:    Intake/Output Summary (Last 24 hours) at 2022 1122  Last data filed at 2022 1620  Gross per 24 hour   Intake 4055 ml   Output    Net 4055 ml        Non stress test:  Reactive    Patient Vitals for the past 4 hrs: Mode Fetal Heart Rate Variability Decelerations Accelerations RN Reviewed Strip? Non Stress Test   22 0808 External 135 6-25 BPM None Yes Yes Reactive   22 0749 External 130           Patient Vitals for the past 4 hrs: Mode Fetal Heart Rate Variability Decelerations Accelerations RN Reviewed Strip? Non Stress Test   22 0808 External 135 6-25 BPM None Yes Yes Reactive   22 0749 External 130             Uterine Activity: None     Exam:  Patient without distress.      Abdomen, fundus soft non-tender     Extremities, no redness or tenderness               Additional Exam: Bedside US- vtx, DANIEL=10, BPP 8/8    Labs:     Lab Results   Component Value Date/Time    WBC 12.2 (H) 04/24/2022 04:46 AM    WBC 15.6 (H) 04/22/2022 10:57 AM    WBC 9.1 12/21/2015 05:40 PM    HGB 10.3 (L) 04/24/2022 04:46 AM    HGB 11.5 04/22/2022 10:57 AM    HGB 14.6 12/21/2015 05:40 PM    HCT 32.0 (L) 04/24/2022 04:46 AM    HCT 35.7 04/22/2022 10:57 AM    HCT 42.9 12/21/2015 05:40 PM    PLATELET 159 84/50/7796 04:46 AM    PLATELET 119 99/26/9394 10:57 AM    PLATELET 093 58/23/9816 05:40 PM       Recent Results (from the past 24 hour(s))   CBC W/O DIFF    Collection Time: 04/24/22  4:46 AM   Result Value Ref Range    WBC 12.2 (H) 3.6 - 11.0 K/uL    RBC 4.04 3.80 - 5.20 M/uL    HGB 10.3 (L) 11.5 - 16.0 g/dL    HCT 32.0 (L) 35.0 - 47.0 %    MCV 79.2 (L) 80.0 - 99.0 FL    MCH 25.5 (L) 26.0 - 34.0 PG    MCHC 32.2 30.0 - 36.5 g/dL    RDW 14.0 11.5 - 14.5 %    PLATELET 846 147 - 920 K/uL    MPV 10.3 8.9 - 12.9 FL    NRBC 0.0 0  WBC    ABSOLUTE NRBC 0.00 0.00 - 8.82 K/uL   METABOLIC PANEL, COMPREHENSIVE    Collection Time: 04/24/22  4:46 AM   Result Value Ref Range    Sodium 139 136 - 145 mmol/L    Potassium 3.8 3.5 - 5.1 mmol/L    Chloride 109 (H) 97 - 108 mmol/L    CO2 21 21 - 32 mmol/L    Anion gap 9 5 - 15 mmol/L    Glucose 90 65 - 100 mg/dL    BUN 8 6 - 20 MG/DL    Creatinine 0.61 0.55 - 1.02 MG/DL    BUN/Creatinine ratio 13 12 - 20      GFR est AA >60 >60 ml/min/1.73m2    GFR est non-AA >60 >60 ml/min/1.73m2    Calcium 8.5 8.5 - 10.1 MG/DL    Bilirubin, total 0.2 0.2 - 1.0 MG/DL    ALT (SGPT) 12 12 - 78 U/L    AST (SGOT) 10 (L) 15 - 37 U/L    Alk.  phosphatase 96 45 - 117 U/L    Protein, total 5.9 (L) 6.4 - 8.2 g/dL    Albumin 2.1 (L) 3.5 - 5.0 g/dL    Globulin 3.8 2.0 - 4.0 g/dL    A-G Ratio 0.6 (L) 1.1 - 2.2

## 2022-04-24 NOTE — DISCHARGE INSTRUCTIONS
Patient Education   Patient Education        Preeclampsia: Care Instructions  Overview     Preeclampsia occurs when a woman's blood pressure rises during pregnancy. Often with preeclampsia, you also have swelling in your legs, hands, and face. A test may show too much protein in your urine. If preeclampsia is severe and not treated, it can lead to seizures (eclampsia) and damage to your liver or kidneys. Preeclampsia can prevent your baby from getting enough food and oxygen. This can cause a low birth weight or other problems. Your doctor will watch you closely to prevent these problems. Your doctor also may recommend that you reduce your activity. If your preeclampsia is a danger to your health or the health of your baby, your doctor may need to deliver your baby early. While preeclampsia is a concern, most women with preeclampsia have healthy babies. After a woman gives birth, preeclampsia usually goes away on its own. But symptoms may last a few weeks or more and can get worse after delivery. Rarely, symptoms of preeclampsia don't show up until days or even weeks after childbirth. Follow-up care is a key part of your treatment and safety. Be sure to make and go to all appointments, and call your doctor if you are having problems. It's also a good idea to know your test results and keep a list of the medicines you take. How can you care for yourself at home? · Take and record your blood pressure at home if your doctor tells you to. ? Ask your doctor to check your blood pressure monitor to be sure that it is accurate and that the cuff fits you. Also ask your doctor to watch you to make sure that you are using it right. ? You should not eat, use tobacco products, or use medicine known to raise blood pressure (such as some nasal decongestant sprays) before you take your blood pressure. ? Avoid taking your blood pressure if you have just exercised. Also avoid taking it if you are nervous or upset.  Rest at least 15 minutes before you take your blood pressure. · You may need to take medicine to manage your blood pressure. Take your medicines exactly as prescribed. Call your doctor if you think you are having a problem with your medicine. · Do not smoke. Quitting smoking will help improve your baby's growth and health. If you need help quitting, talk to your doctor about stop-smoking programs and medicines. These can increase your chances of quitting for good. · Eat a balanced and healthy diet that has lots of fruits and vegetables. · You can keep track of your baby's health by checking your baby's movement. A common method for this is to note the length of time it takes to count 10 movements (such as kicks, flutters, or rolls). Call your doctor if you don't feel at least 10 movements in a 2-hour period. Track your baby's movements once each day. Bring this record with you to each prenatal visit. When should you call for help? Share this information with your partner or a friend. They can help you watch for warning signs. Call 911  anytime you think you may need emergency care. For example, call if:    · You passed out (lost consciousness).     · You have a seizure. Call your doctor now or seek immediate medical care if:    · You have symptoms of preeclampsia, such as:  ? Sudden swelling of your face, hands, or feet. ? New vision problems (such as dimness, blurring, or seeing spots). ? A severe headache.     · Your blood pressure is very high, such as 160/110 or higher.     · Your blood pressure is higher than your doctor told you it should be, or it rises quickly.     · You have new nausea or vomiting.     · You think that you are in labor.     · You have pain in your belly or pelvis. Watch closely for changes in your health, and be sure to contact your doctor if:    · You gain weight rapidly. Where can you learn more?   Go to http://www.gray.com/  Enter Z954 in the search box to learn more about \"Preeclampsia: Care Instructions. \"  Current as of: June 16, 2021               Content Version: 13.2  © 9599-8264 [employer]. Care instructions adapted under license by [employer] (which disclaims liability or warranty for this information). If you have questions about a medical condition or this instruction, always ask your healthcare professional. [address] any warranty or liability for your use of this information. Learning About Low Amniotic Fluid  What is low amniotic fluid? Low amniotic fluid means that there is too little fluid around your baby in the uterus during pregnancy. Having a low amount of this fluid can affect how the baby grows. It may lead to problems during labor and delivery. Amniotic fluid protects your baby from being bumped or hurt as you move your body. And it keeps your baby at a healthy temperature. The fluid helps your baby move around in the uterus. What causes low amniotic fluid? In many cases, the cause of low amniotic fluid may not be found. But causes may include:  · A health problem you have, such as high blood pressure. · A problem with the placenta. This is a large organ that grows in your uterus during pregnancy. It supplies your baby with nutrients and oxygen through the umbilical cord. · Some medicines. · A problem with the baby's kidneys or urinary tract. What are the symptoms of low amniotic fluid? Some of the symptoms may include:  · Fluid leaking from your vagina. · Your uterus not growing as expected. This means that the size of your pregnant belly is not as large as it should be, as measured from top to bottom by your doctor. · Your baby's movements slowing down. How is low amniotic fluid diagnosed? Doctors use ultrasound to measure the amount of amniotic fluid in your uterus. How is low amniotic fluid treated? If you're near the end of your pregnancy, you may not need treatment. Depending on what's causing the problem and how close you are to delivery, your doctor may want to try to start (induce) labor. You may also be asked to drink more water. Or you may be given fluids through an intravenous (IV) needle into a vein. Your doctor may want to see you more often. Follow-up care is a key part of your treatment and safety. Be sure to make and go to all appointments, and call your doctor if you are having problems. It's also a good idea to know your test results and keep a list of the medicines you take. Where can you learn more? Go to http://www.gray.com/  Enter A006 in the search box to learn more about \"Learning About Low Amniotic Fluid. \"  Current as of: June 16, 2021               Content Version: 13.2  © 6118-0227 [employer]. Care instructions adapted under license by [employer] (which disclaims liability or warranty for this information). If you have questions about a medical condition or this instruction, always ask your healthcare professional. [address] any warranty or liability for your use of this information.

## 2022-04-24 NOTE — PROGRESS NOTES
1930 - Assumed care of patient from BÁRBARA Perez RN    1887 - Bedside shift change report given to FAUSTO Ocampo RN (oncoming nurse) by DION Ch RN (offgoing nurse). Report included the following information SBAR, Kardex, Intake/Output and MAR.

## 2022-04-24 NOTE — DISCHARGE SUMMARY
Antepartum  Discharge Summary     Patient ID:  Seema Ornelas  [id number]  39 y.o.  1990    Admit date: 4/22/2022    Discharge date: 4/24/2022    Admission Diagnoses:    Patient Active Problem List   Diagnosis Code    Depression F32. A    Borderline personality disorder ([facility] F60.3    PTSD (post-traumatic stress disorder) F43.10    Hypertension affecting pregnancy O16.9       Discharge Diagnoses: There are no discharge diagnoses documented for the most recent discharge. Patient Active Problem List   Diagnosis Code    Depression F32. A    Borderline personality disorder ([facility] F60.3    PTSD (post-traumatic stress disorder) F43.10    Hypertension affecting pregnancy O16.9       Procedures for this admission:     Hospital Course:  Patient was admitted at 39 3/7 weeks with elevated BPs in the office for rule out pre-eclampsia. She was also noted to have borderline low DANIEL=5.2. She had normal labs and normal to mild range BPs with UPC ratio=0.3 consistent with pre-eclampsia without severe features. She had TID NSTs that were reactive and was given IVFs and repeat US on HD 3 showed improved DANIEL=10 and BPP 8/8. She was discharged home with plan to return in 2 days at 37 wks for induction    Disposition: Home or self care    Discharged Condition: stable            Patient Instructions:   Current Discharge Medication List      CONTINUE these medications which have NOT CHANGED    Details   busPIRone (BUSPAR) 10 mg tablet Take 5 mg by mouth two (2) times a day. ondansetron hcl (Zofran) 4 mg tablet Take 4 mg by mouth every eight (8) hours as needed for Nausea or Vomiting. promethazine (PHENERGAN) 12.5 mg tablet Take 12.5 mg by mouth every six (6) hours as needed for Nausea. levothyroxine (Synthroid) 75 mcg tablet Take 75 mcg by mouth Daily (before breakfast). PNV Comb #2-Iron-FA-Omega 3 29-1-400 mg cmpk Take  by mouth. aspirin 81 mg chewable tablet Take 81 mg by mouth daily. PARoxetine (PAXIL) 40 mg tablet Take 1 Tab by mouth nightly. Indications: ANXIETY WITH DEPRESSION  Qty: 30 Tab, Refills: 0      QUEtiapine (SEROQUEL) 50 mg tablet Take 1 Tab by mouth three (3) times daily. Indications: DEPRESSION TREATMENT ADJUNCT  Qty: 90 Tab, Refills: 0           Activity: Activity as tolerated  Diet: Regular Diet    Follow-up with   Follow-up Appointments   Procedures    FOLLOW UP VISIT Appointment in: Other (María Elena Romero) [facility] [address] [facility] and induction     Tuesday [facility] [address] [facility] for [facility] and induction     Standing Status:   Standing     Number of Occurrences:   1     Order Specific Question:   Appointment in     Answer:    Other (Specify)        Signed:  David Emery MD  4/24/2022  11:28 AM

## 2022-04-25 ENCOUNTER — HOSPITAL ENCOUNTER (EMERGENCY)
Age: 32
Discharge: HOME | DRG: 773 | End: 2022-04-25
Attending: OBSTETRICS & GYNECOLOGY | Admitting: OBSTETRICS & GYNECOLOGY
Payer: OTHER GOVERNMENT

## 2022-04-25 VITALS
HEART RATE: 125 BPM | WEIGHT: 268 LBS | HEIGHT: 63 IN | RESPIRATION RATE: 16 BRPM | SYSTOLIC BLOOD PRESSURE: 135 MMHG | OXYGEN SATURATION: 99 % | BODY MASS INDEX: 47.48 KG/M2 | DIASTOLIC BLOOD PRESSURE: 94 MMHG | TEMPERATURE: 98.2 F

## 2022-04-25 LAB
A1 MICROGLOB PLACENTAL VAG QL: NEGATIVE
CONTROL LINE PRESENT?: NORMAL
DAILY QC (YES/NO)?: YES
EXPIRATION DATE: NORMAL
INTERNAL NEGATIVE CONTROL: NORMAL
KIT LOT NO.: NORMAL
PH, VAGINAL FLUID: 4.5 (ref 5–6.1)

## 2022-04-25 PROCEDURE — 76817 TRANSVAGINAL US OBSTETRIC: CPT

## 2022-04-25 PROCEDURE — 99285 EMERGENCY DEPT VISIT HI MDM: CPT

## 2022-04-25 PROCEDURE — 84112 EVAL AMNIOTIC FLUID PROTEIN: CPT

## 2022-04-25 PROCEDURE — 59025 FETAL NON-STRESS TEST: CPT

## 2022-04-25 PROCEDURE — 83986 ASSAY PH BODY FLUID NOS: CPT | Performed by: OBSTETRICS & GYNECOLOGY

## 2022-04-25 PROCEDURE — 84112 EVAL AMNIOTIC FLUID PROTEIN: CPT | Performed by: OBSTETRICS & GYNECOLOGY

## 2022-04-26 ENCOUNTER — HOSPITAL ENCOUNTER (INPATIENT)
Age: 32
LOS: 5 days | Discharge: HOME | DRG: 773 | End: 2022-05-01
Attending: STUDENT IN AN ORGANIZED HEALTH CARE EDUCATION/TRAINING PROGRAM | Admitting: STUDENT IN AN ORGANIZED HEALTH CARE EDUCATION/TRAINING PROGRAM
Payer: OTHER GOVERNMENT

## 2022-04-26 DIAGNOSIS — G89.18 POST-OP PAIN: Primary | ICD-10-CM

## 2022-04-26 LAB
ABO + RH BLD: NORMAL
ALBUMIN SERPL-MCNC: 2.4 G/DL (ref 3.5–5)
ALBUMIN/GLOB SERPL: 0.6 {RATIO} (ref 1.1–2.2)
ALP SERPL-CCNC: 109 U/L (ref 45–117)
ALT SERPL-CCNC: 12 U/L (ref 12–78)
AMPHET UR QL SCN: NEGATIVE
ANION GAP SERPL CALC-SCNC: 7 MMOL/L (ref 5–15)
AST SERPL-CCNC: 12 U/L (ref 15–37)
BARBITURATES UR QL SCN: NEGATIVE
BASOPHILS # BLD: 0 K/UL (ref 0–0.1)
BASOPHILS NFR BLD: 0 % (ref 0–1)
BENZODIAZ UR QL: NEGATIVE
BILIRUB SERPL-MCNC: 0.3 MG/DL (ref 0.2–1)
BLOOD GROUP ANTIBODIES SERPL: NORMAL
BUN SERPL-MCNC: 13 MG/DL (ref 6–20)
BUN/CREAT SERPL: 19 (ref 12–20)
CALCIUM SERPL-MCNC: 8.8 MG/DL (ref 8.5–10.1)
CANNABINOIDS UR QL SCN: NEGATIVE
CHLORIDE SERPL-SCNC: 109 MMOL/L (ref 97–108)
CO2 SERPL-SCNC: 21 MMOL/L (ref 21–32)
COCAINE UR QL SCN: NEGATIVE
CREAT SERPL-MCNC: 0.69 MG/DL (ref 0.55–1.02)
DIFFERENTIAL METHOD BLD: ABNORMAL
DRUG SCRN COMMENT,DRGCM: NORMAL
EOSINOPHIL # BLD: 0.1 K/UL (ref 0–0.4)
EOSINOPHIL NFR BLD: 0 % (ref 0–7)
ERYTHROCYTE [DISTWIDTH] IN BLOOD BY AUTOMATED COUNT: 14.1 % (ref 11.5–14.5)
GLOBULIN SER CALC-MCNC: 4 G/DL (ref 2–4)
GLUCOSE SERPL-MCNC: 128 MG/DL (ref 65–100)
HCT VFR BLD AUTO: 34.7 % (ref 35–47)
HGB BLD-MCNC: 11.2 G/DL (ref 11.5–16)
IMM GRANULOCYTES # BLD AUTO: 0.1 K/UL (ref 0–0.04)
IMM GRANULOCYTES NFR BLD AUTO: 1 % (ref 0–0.5)
LYMPHOCYTES # BLD: 1.7 K/UL (ref 0.8–3.5)
LYMPHOCYTES NFR BLD: 11 % (ref 12–49)
MCH RBC QN AUTO: 25.6 PG (ref 26–34)
MCHC RBC AUTO-ENTMCNC: 32.3 G/DL (ref 30–36.5)
MCV RBC AUTO: 79.4 FL (ref 80–99)
METHADONE UR QL: NEGATIVE
MONOCYTES # BLD: 1 K/UL (ref 0–1)
MONOCYTES NFR BLD: 6 % (ref 5–13)
NEUTS SEG # BLD: 12.9 K/UL (ref 1.8–8)
NEUTS SEG NFR BLD: 82 % (ref 32–75)
NRBC # BLD: 0 K/UL (ref 0–0.01)
NRBC BLD-RTO: 0 PER 100 WBC
OPIATES UR QL: NEGATIVE
PCP UR QL: NEGATIVE
PLATELET # BLD AUTO: 341 K/UL (ref 150–400)
PMV BLD AUTO: 10.4 FL (ref 8.9–12.9)
POTASSIUM SERPL-SCNC: 3.5 MMOL/L (ref 3.5–5.1)
PROT SERPL-MCNC: 6.4 G/DL (ref 6.4–8.2)
RBC # BLD AUTO: 4.37 M/UL (ref 3.8–5.2)
SODIUM SERPL-SCNC: 137 MMOL/L (ref 136–145)
SPECIMEN EXP DATE BLD: NORMAL
WBC # BLD AUTO: 15.7 K/UL (ref 3.6–11)

## 2022-04-26 PROCEDURE — 75410000002 HC LABOR FEE PER 1 HR

## 2022-04-26 PROCEDURE — 74011250636 HC RX REV CODE- 250/636: Performed by: STUDENT IN AN ORGANIZED HEALTH CARE EDUCATION/TRAINING PROGRAM

## 2022-04-26 PROCEDURE — 65410000002 HC RM PRIVATE OB

## 2022-04-26 PROCEDURE — 85025 COMPLETE CBC W/AUTO DIFF WBC: CPT

## 2022-04-26 PROCEDURE — 80307 DRUG TEST PRSMV CHEM ANLYZR: CPT

## 2022-04-26 PROCEDURE — 59200 INSERT CERVICAL DILATOR: CPT

## 2022-04-26 PROCEDURE — 86900 BLOOD TYPING SEROLOGIC ABO: CPT

## 2022-04-26 PROCEDURE — 36415 COLL VENOUS BLD VENIPUNCTURE: CPT

## 2022-04-26 PROCEDURE — 74011250637 HC RX REV CODE- 250/637: Performed by: STUDENT IN AN ORGANIZED HEALTH CARE EDUCATION/TRAINING PROGRAM

## 2022-04-26 PROCEDURE — 3E0DXGC INTRODUCTION OF OTHER THERAPEUTIC SUBSTANCE INTO MOUTH AND PHARYNX, EXTERNAL APPROACH: ICD-10-PCS | Performed by: STUDENT IN AN ORGANIZED HEALTH CARE EDUCATION/TRAINING PROGRAM

## 2022-04-26 PROCEDURE — 80053 COMPREHEN METABOLIC PANEL: CPT

## 2022-04-26 RX ORDER — NALBUPHINE HYDROCHLORIDE 10 MG/ML
10 INJECTION, SOLUTION INTRAMUSCULAR; INTRAVENOUS; SUBCUTANEOUS
Status: DISCONTINUED | OUTPATIENT
Start: 2022-04-26 | End: 2022-04-28

## 2022-04-26 RX ORDER — ACETAMINOPHEN 325 MG/1
650 TABLET ORAL
Status: DISCONTINUED | OUTPATIENT
Start: 2022-04-26 | End: 2022-04-28

## 2022-04-26 RX ORDER — DIPHENHYDRAMINE HYDROCHLORIDE 50 MG/ML
12.5 INJECTION, SOLUTION INTRAMUSCULAR; INTRAVENOUS
Status: DISCONTINUED | OUTPATIENT
Start: 2022-04-26 | End: 2022-04-28

## 2022-04-26 RX ORDER — OXYTOCIN/RINGER'S LACTATE 30/500 ML
10 PLASTIC BAG, INJECTION (ML) INTRAVENOUS AS NEEDED
Status: DISCONTINUED | OUTPATIENT
Start: 2022-04-26 | End: 2022-04-28

## 2022-04-26 RX ORDER — ONDANSETRON 2 MG/ML
4 INJECTION INTRAMUSCULAR; INTRAVENOUS
Status: DISCONTINUED | OUTPATIENT
Start: 2022-04-26 | End: 2022-04-28

## 2022-04-26 RX ORDER — NALOXONE HYDROCHLORIDE 0.4 MG/ML
0.4 INJECTION, SOLUTION INTRAMUSCULAR; INTRAVENOUS; SUBCUTANEOUS AS NEEDED
Status: DISCONTINUED | OUTPATIENT
Start: 2022-04-26 | End: 2022-04-28

## 2022-04-26 RX ORDER — SODIUM CHLORIDE, SODIUM LACTATE, POTASSIUM CHLORIDE, CALCIUM CHLORIDE 600; 310; 30; 20 MG/100ML; MG/100ML; MG/100ML; MG/100ML
125 INJECTION, SOLUTION INTRAVENOUS CONTINUOUS
Status: DISCONTINUED | OUTPATIENT
Start: 2022-04-26 | End: 2022-04-28

## 2022-04-26 RX ORDER — OXYTOCIN/RINGER'S LACTATE 30/500 ML
1-30 PLASTIC BAG, INJECTION (ML) INTRAVENOUS
Status: DISCONTINUED | OUTPATIENT
Start: 2022-04-27 | End: 2022-04-28

## 2022-04-26 RX ORDER — SODIUM CHLORIDE 0.9 % (FLUSH) 0.9 %
5-40 SYRINGE (ML) INJECTION EVERY 8 HOURS
Status: DISCONTINUED | OUTPATIENT
Start: 2022-04-26 | End: 2022-04-28

## 2022-04-26 RX ORDER — OXYTOCIN/RINGER'S LACTATE 30/500 ML
87.3 PLASTIC BAG, INJECTION (ML) INTRAVENOUS AS NEEDED
Status: DISCONTINUED | OUTPATIENT
Start: 2022-04-26 | End: 2022-04-28

## 2022-04-26 RX ORDER — SODIUM CHLORIDE 0.9 % (FLUSH) 0.9 %
5-40 SYRINGE (ML) INJECTION AS NEEDED
Status: DISCONTINUED | OUTPATIENT
Start: 2022-04-26 | End: 2022-04-28

## 2022-04-26 RX ADMIN — NALBUPHINE HYDROCHLORIDE 10 MG: 10 INJECTION, SOLUTION INTRAMUSCULAR; INTRAVENOUS; SUBCUTANEOUS at 18:58

## 2022-04-26 RX ADMIN — Medication 25 MCG: at 20:10

## 2022-04-26 RX ADMIN — ACETAMINOPHEN 325MG 650 MG: 325 TABLET ORAL at 16:27

## 2022-04-26 RX ADMIN — NALBUPHINE HYDROCHLORIDE 10 MG: 10 INJECTION, SOLUTION INTRAMUSCULAR; INTRAVENOUS; SUBCUTANEOUS at 21:53

## 2022-04-26 RX ADMIN — ONDANSETRON 4 MG: 2 INJECTION INTRAMUSCULAR; INTRAVENOUS at 17:28

## 2022-04-26 NOTE — H&P
OB History & Physical    Name: Xander Ball MRN: [id number]  SSN: [id number]    YOB: 1990  Age: 36 y.o. Sex: female      Subjective:     Chief complaint: leaking fluid    History of Present Illness: Xander Ball is a 36 y.o.  female with an estimated gestational age of 36w7d with Estimated Date of Delivery: 22. Patient complains of leaking fluid. Had a large gush and had some continued leaking that dampened a pad. It has not continued to leak. Baby is moving well. No vaginal bleeding. She had a headache earlier which has been her same headache that resolved with rest and a shower. She has no blurred vision or right upper quadrant pain     OB History        2    Para        Term                AB   1    Living   0       SAB   1    IAB        Ectopic        Molar        Multiple        Live Births                  Past Medical History:   Diagnosis Date    Anxiety     Asthma     Bipolar 1 disorder ([facility]     Depression     Gestational hypertension     Hypertension affecting pregnancy 2022    Hypothyroidism     IBS (irritable bowel syndrome)     Immunosuppression (HCC)      Past Surgical History:   Procedure Laterality Date    HX OTHER SURGICAL      wisdom teeth removal      Social History     Occupational History    Not on file   Tobacco Use    Smoking status: Never Smoker    Smokeless tobacco: Never Used   Vaping Use    Vaping Use: Former   Substance and Sexual Activity    Alcohol use: Not Currently    Drug use: No    Sexual activity: Never     History reviewed. No pertinent family history. Allergies   Allergen Reactions    Bee Venom Protein (Honey Bee) Anaphylaxis    Keflex [Cephalexin] Anaphylaxis    Pcn [Penicillins] Anaphylaxis    Raspberries [Raspberry] Anaphylaxis    Sulfa (Sulfonamide Antibiotics) Anaphylaxis    Tramadol Rash     Prior to Admission medications    Medication Sig Start Date End Date Taking?  Authorizing Provider busPIRone (BUSPAR) 10 mg tablet Take 5 mg by mouth two (2) times a day. Yes Provider, Historical   ondansetron hcl (Zofran) 4 mg tablet Take 4 mg by mouth every eight (8) hours as needed for Nausea or Vomiting. Yes Provider, Historical   promethazine (PHENERGAN) 12.5 mg tablet Take 12.5 mg by mouth every six (6) hours as needed for Nausea. Yes Provider, Historical   levothyroxine (Synthroid) 75 mcg tablet Take 75 mcg by mouth Daily (before breakfast). Yes Provider, Historical   PNV Comb #2-Iron-FA-Omega 3 29-1-400 mg cmpk Take  by mouth. Yes Provider, Historical   aspirin 81 mg chewable tablet Take 81 mg by mouth daily. Yes Provider, Historical   PARoxetine (PAXIL) 40 mg tablet Take 1 Tab by mouth nightly. Indications: ANXIETY WITH DEPRESSION  Patient not taking: Reported on 4/6/2022 12/25/15   Jodie Lemus MD        Review of Systems    Objective:     Vitals:    Vitals:    04/25/22 2136 04/25/22 2138 04/25/22 2144 04/25/22 2158   BP: (!) 135/93   (!) 135/94   Pulse: (!) 126   (!) 125   SpO2:  100%  99%   Weight:   121.6 kg (268 lb)    Height:   5' 3\" (1.6 m)       No data recorded. BP  Min: 135/94  Max: 135/94     Physical Exam  General: in NAD  HEENT: normocephalic  Cardiovascular: regular rate and rhythm  Lungs: clear to auscultation bilaterally  Abdomen: Gravid, soft, nontender, no abnormal masses, rebound, rigidity, guarding  Fundus: soft, nontender    Neurological: DTR's 2+  Pelvic:Cervical Exam: not checked  Uterine Activity: no contractions detected  Membranes: no gross evidence of ROM  Fetal Heart Rate: 150's adequate variability and reactivity; no significant abnormal decelerations    Ultrasound: vertex, DANIEL 8 cm. Placenta anterior    Labs: No results found for this or any previous visit (from the past 24 hour(s)). Patient Active Problem List   Diagnosis Code    Depression F32. A    Borderline personality disorder ([facility] F60.3    PTSD (post-traumatic stress disorder) F43.10    Hypertension affecting pregnancy O16.9     Assessment and Plan:      @ 36w6d presents with concerns for leaking fluid    1. IUP- category 1    2. Leaking fluid- negative Nitrazine and amni sure, US: vertex and DANIEL 8 cm. 3. Pre eclampsia- has cervical ripening for tomorrow. No labor signs currently, cervical exam deferred. Blood pressure none severe and exam is reassuring.        Signed By:  Romina Gomez MD     2022

## 2022-04-26 NOTE — PROGRESS NOTES
: Xander Ball is a 36 y.o.   at 36w6d patient of Dr Maria L Mckeon at [facility] who presents to L&D triage with c/o ROM . She reports Positive FM, denies vaginal bleeding and contractions. She also denies Headaches, Scotoma, RUQ pain and Edema. Urine sample obtained. EFM and toco placed for initial assessment. : Nitrazine test completed. Resulted negative. : Amnisure test completed. Resulted negative. : MD called for pt evaluation. : Dr Michelle Menjivar at bedside to evaluate and gave orders for discharge. : Pt provided discharge education. Opportunity given to ask questions. All questions answered. Pt ambulated off unit. Pt remains pregnant and will arrive tomorrow for IOL.

## 2022-04-26 NOTE — PROGRESS NOTES
[address] [facility] [place] [address] Moise Perez at bedside, SVE performed, CRB placed and strip reviewed.

## 2022-04-26 NOTE — PROGRESS NOTES
Lis Posey is a 36 y.o.   at 37w0d patient of Dr Freda Saldana at [facility] who presents to L&D  for IOL for pre-e. She reports Positive FM, denies vaginal bleeding, LOF and contractions. She also denies Scotoma, RUQ pain and Edema. Urine sample obtained. EFM and toco placed for initial assessment. 1606: Dr. Brian Garnica at bedside, SVE performed and strip reviewed. U/S done, aguilar balloon placed. POC discussed with patient.

## 2022-04-26 NOTE — PROGRESS NOTES
Non-Stress Test    Brief history, indication: leaking fluid    Findings:  Baseline  bpm; moderate variability; greater than two accelerations to 175 bpm; no significant decelerations noted.     Result: Reactive NST    Nonstress test interpreted by myself      Lilliam Rodas MD

## 2022-04-26 NOTE — H&P
History & Physical    Name: Aubrey Rodriguez MRN: [id number]  SSN: [id number]    YOB: 1990  Age: 36 y.o. Sex: female      Subjective:     Estimated Date of Delivery: 22  OB History    Para Term  AB Living   2       1 0   SAB IAB Ectopic Molar Multiple Live Births   1                # Outcome Date GA Lbr Enrique/2nd Weight Sex Delivery Anes PTL Lv   2 Current            1 SAB 21               Ms. Yoko William is a 36 y.o.  @ 37w0d presenting for IOL for preE without severe features with elevated Bps on previous triage visits and prcr of .3 on . Doing well today without any issues. +FM. Denies LOF, VB. Pregnancy c/b:  - Initial BMI 40.4  - Fetal left pelvic kidney - needs US after delivery   - hypothryoid - on synthroid 75mcg daily   - depression   - asthma - rare albuterol use   - IBS    Past Medical History:   Diagnosis Date    Anxiety     Asthma     [facility] disorder ([facility]     Depression     Gestational hypertension     Hypertension affecting pregnancy 2022    Hypothyroidism     IBS (irritable bowel syndrome)     Immunosuppression (HCC)      Past Surgical History:   Procedure Laterality Date    HX OTHER SURGICAL      wisdom teeth removal      Social History     Occupational History    Not on file   Tobacco Use    Smoking status: Never Smoker    Smokeless tobacco: Never Used   Vaping Use    Vaping Use: Former   Substance and Sexual Activity    Alcohol use: Not Currently    Drug use: No    Sexual activity: Never     History reviewed. No pertinent family history. Allergies   Allergen Reactions    Bee Venom Protein (Honey Bee) Anaphylaxis    Keflex [Cephalexin] Anaphylaxis    Pcn [Penicillins] Anaphylaxis    Raspberries [Raspberry] Anaphylaxis    Sulfa (Sulfonamide Antibiotics) Anaphylaxis    Tramadol Rash     Prior to Admission medications    Medication Sig Start Date End Date Taking?  Authorizing Provider   busPIRone (BUSPAR) 10 mg tablet Take 5 mg by mouth two (2) times a day. Yes Provider, Historical   ondansetron hcl (Zofran) 4 mg tablet Take 4 mg by mouth every eight (8) hours as needed for Nausea or Vomiting. Yes Provider, Historical   promethazine (PHENERGAN) 12.5 mg tablet Take 12.5 mg by mouth every six (6) hours as needed for Nausea. Yes Provider, Historical   levothyroxine (Synthroid) 75 mcg tablet Take 75 mcg by mouth Daily (before breakfast). Yes Provider, Historical   PNV Comb #2-Iron-FA-Omega 3 29-1-400 mg cmpk Take  by mouth. Yes Provider, Historical   aspirin 81 mg chewable tablet Take 81 mg by mouth daily. Yes Provider, Historical   PARoxetine (PAXIL) 40 mg tablet Take 1 Tab by mouth nightly. Indications: ANXIETY WITH DEPRESSION  Patient not taking: Reported on 4/6/2022 12/25/15   Khushi Grider MD        Review of Systems    Objective:     Vitals:  Vitals:    04/26/22 1502   Weight: 121.6 kg (268 lb)   Height: 5' 3\" (1.6 m)        Physical Exam      Cervical Exam: 1/long/high  Membranes: Intact  Fetal Heart Rate: 140s/mod alfonso/+accels/no decels    Vertex on bedside ultrasound     Prenatal Labs:    Lab Results   Component Value Date/Time    Rubella, External Immune 11/19/2021 12:00 AM    GrBStrep, External Negative  04/22/2022 12:00 AM    HBsAg, External Non reactive 11/19/2021 12:00 AM    HIV, External Non reactive  11/19/2021 12:00 AM    Gonorrhea, External Negative 11/19/2021 12:00 AM    Chlamydia, External Negative 11/19/2021 12:00 AM    ABO,Rh O positive 11/19/2021 12:00 AM          Impression/Plan:     Active Problems:    * No active hospital problems. *       Plan: Admit for induction of labor. Group B Strep negative. Start IOL with cook 60/60 and miso 25mcg buccally q4 hours. Of note, fetus has a left pelvic kidney and needs an US after delivery.      Sky Linda MD  4/26/2022  4:28 PM

## 2022-04-27 ENCOUNTER — ANESTHESIA EVENT (OUTPATIENT)
Dept: LABOR AND DELIVERY | Age: 32
DRG: 773 | End: 2022-04-27
Payer: OTHER GOVERNMENT

## 2022-04-27 ENCOUNTER — ANESTHESIA (OUTPATIENT)
Dept: LABOR AND DELIVERY | Age: 32
DRG: 773 | End: 2022-04-27
Payer: OTHER GOVERNMENT

## 2022-04-27 PROCEDURE — 77030014125 HC TY EPDRL BBMI -B: Performed by: STUDENT IN AN ORGANIZED HEALTH CARE EDUCATION/TRAINING PROGRAM

## 2022-04-27 PROCEDURE — 74011250636 HC RX REV CODE- 250/636: Performed by: STUDENT IN AN ORGANIZED HEALTH CARE EDUCATION/TRAINING PROGRAM

## 2022-04-27 PROCEDURE — 74011250636 HC RX REV CODE- 250/636: Performed by: OBSTETRICS & GYNECOLOGY

## 2022-04-27 PROCEDURE — 76060000078 HC EPIDURAL ANESTHESIA

## 2022-04-27 PROCEDURE — 65410000002 HC RM PRIVATE OB

## 2022-04-27 PROCEDURE — 74011250637 HC RX REV CODE- 250/637: Performed by: STUDENT IN AN ORGANIZED HEALTH CARE EDUCATION/TRAINING PROGRAM

## 2022-04-27 PROCEDURE — 10907ZC DRAINAGE OF AMNIOTIC FLUID, THERAPEUTIC FROM PRODUCTS OF CONCEPTION, VIA NATURAL OR ARTIFICIAL OPENING: ICD-10-PCS | Performed by: STUDENT IN AN ORGANIZED HEALTH CARE EDUCATION/TRAINING PROGRAM

## 2022-04-27 PROCEDURE — 74011000250 HC RX REV CODE- 250: Performed by: STUDENT IN AN ORGANIZED HEALTH CARE EDUCATION/TRAINING PROGRAM

## 2022-04-27 PROCEDURE — 75410000002 HC LABOR FEE PER 1 HR

## 2022-04-27 RX ORDER — BUPIVACAINE HYDROCHLORIDE AND EPINEPHRINE 2.5; 5 MG/ML; UG/ML
INJECTION, SOLUTION EPIDURAL; INFILTRATION; INTRACAUDAL; PERINEURAL AS NEEDED
Status: DISCONTINUED | OUTPATIENT
Start: 2022-04-27 | End: 2022-04-28 | Stop reason: HOSPADM

## 2022-04-27 RX ORDER — FENTANYL/BUPIVACAINE/NS/PF 2-1250MCG
1-16 PREFILLED PUMP RESERVOIR EPIDURAL CONTINUOUS
Status: DISCONTINUED | OUTPATIENT
Start: 2022-04-27 | End: 2022-04-28

## 2022-04-27 RX ORDER — BUPIVACAINE HYDROCHLORIDE AND EPINEPHRINE 2.5; 5 MG/ML; UG/ML
INJECTION, SOLUTION EPIDURAL; INFILTRATION; INTRACAUDAL; PERINEURAL
Status: COMPLETED
Start: 2022-04-27 | End: 2022-04-27

## 2022-04-27 RX ADMIN — OXYTOCIN 1 MILLI-UNITS/MIN: 10 INJECTION INTRAVENOUS at 05:29

## 2022-04-27 RX ADMIN — Medication 12 ML/HR: at 09:10

## 2022-04-27 RX ADMIN — Medication 25 MCG: at 00:35

## 2022-04-27 RX ADMIN — Medication 12 ML/HR: at 23:56

## 2022-04-27 RX ADMIN — DIPHENHYDRAMINE HYDROCHLORIDE 12.5 MG: 50 INJECTION, SOLUTION INTRAMUSCULAR; INTRAVENOUS at 00:35

## 2022-04-27 RX ADMIN — SODIUM CHLORIDE, POTASSIUM CHLORIDE, SODIUM LACTATE AND CALCIUM CHLORIDE 999 ML/HR: 600; 310; 30; 20 INJECTION, SOLUTION INTRAVENOUS at 08:08

## 2022-04-27 RX ADMIN — ACETAMINOPHEN 325MG 650 MG: 325 TABLET ORAL at 11:13

## 2022-04-27 RX ADMIN — OXYTOCIN 1 MILLI-UNITS/MIN: 10 INJECTION INTRAVENOUS at 22:00

## 2022-04-27 RX ADMIN — NALBUPHINE HYDROCHLORIDE 10 MG: 10 INJECTION, SOLUTION INTRAMUSCULAR; INTRAVENOUS; SUBCUTANEOUS at 01:28

## 2022-04-27 RX ADMIN — SODIUM CHLORIDE, POTASSIUM CHLORIDE, SODIUM LACTATE AND CALCIUM CHLORIDE 125 ML/HR: 600; 310; 30; 20 INJECTION, SOLUTION INTRAVENOUS at 05:29

## 2022-04-27 RX ADMIN — LEVOTHYROXINE SODIUM 75 MCG: 0.05 TABLET ORAL at 06:34

## 2022-04-27 RX ADMIN — SODIUM CHLORIDE, POTASSIUM CHLORIDE, SODIUM LACTATE AND CALCIUM CHLORIDE 125 ML/HR: 600; 310; 30; 20 INJECTION, SOLUTION INTRAVENOUS at 12:41

## 2022-04-27 RX ADMIN — SODIUM CHLORIDE, POTASSIUM CHLORIDE, SODIUM LACTATE AND CALCIUM CHLORIDE 125 ML/HR: 600; 310; 30; 20 INJECTION, SOLUTION INTRAVENOUS at 20:41

## 2022-04-27 RX ADMIN — Medication 12 ML/HR: at 16:45

## 2022-04-27 RX ADMIN — BUPIVACAINE HYDROCHLORIDE AND EPINEPHRINE 3 ML: 2.5; 5 INJECTION, SOLUTION EPIDURAL; INFILTRATION; INTRACAUDAL; PERINEURAL at 08:53

## 2022-04-27 RX ADMIN — BUPIVACAINE HYDROCHLORIDE AND EPINEPHRINE 3 ML: 2.5; 5 INJECTION, SOLUTION EPIDURAL; INFILTRATION; INTRACAUDAL; PERINEURAL at 08:50

## 2022-04-27 RX ADMIN — BUPIVACAINE HYDROCHLORIDE AND EPINEPHRINE 0.5 ML: 2.5; 5 INJECTION, SOLUTION EPIDURAL; INFILTRATION; INTRACAUDAL; PERINEURAL at 08:50

## 2022-04-27 NOTE — PROGRESS NOTES
2000:  Spoke with Dr. Freda Saldana via telephone. Orders received for Benadryl at this time. Orders received to give Cytotec for 2 doses and hold remaining doses after that.

## 2022-04-27 NOTE — ANESTHESIA PROCEDURE NOTES
CSE Block    Start time: 4/27/2022 8:40 AM  End time: 4/27/2022 8:54 AM  Performed by: Arnaud Clemens MD  Authorized by: Arnaud Clemens MD     Pre-Procedure  Indications: primary anesthetic    preanesthetic checklist: patient identified, risks and benefits discussed, anesthesia consent, site marked, patient being monitored and timeout performed    Timeout Time: 08:38 EDT        Procedure:   Patient Position:  Seated  Prep Region:  Lumbar  Prep: DuraPrep    Location:  L2-3    Epidural Needle:   Needle Type:  Tuohy  Needle Gauge:  17 G  Injection Technique:  Loss of resistance using saline  Attempts:  1    Spinal Needle:   Needle Type:   Edgar  Needle Gauge:  25 G    Catheter:   Catheter Type:  Flex-tip  Catheter Size:  19 G  Catheter at Skin Depth (cm):  11  Depth in Epidural Space (cm):  4.5  Events: no blood with aspiration, no cerebrospinal fluid with aspiration, no paresthesia, negative aspiration test and CSF confirmed    Test Dose:  Negative    Assessment:   Catheter Secured:  Tegaderm and tape  Insertion:  Uncomplicated  Patient tolerance:  Patient tolerated the procedure well with no immediate complications

## 2022-04-27 NOTE — PROGRESS NOTES
04/27/22 0924   Cervical Exam   Dilation (cm) 2   Eff 50 %   Station -3   Baby Position Vertex   Membrane Status AROM  (clear)   G1 at 40 1/7 induction for pre-E without severe features s/p CRB/Cytotec and now on Pitocin.  Comfortable with epidural. AROM performed  -continue Pitocin per unit protocol  -CEFM- currently Category 1 tracing  -hypothyroid- continue Synthroid  -fetal L pelvic kidney- peds aware  -possible fundal accessory lobe to placenta- noted  -bipolar on Buspar  -h/o depression- may want to start on zoloft PP  -asthma-inhaler prn

## 2022-04-27 NOTE — ANESTHESIA PREPROCEDURE EVALUATION
Relevant Problems   NEUROLOGY   (+) Borderline personality disorder (HCC)   (+) Depression   (+) PTSD (post-traumatic stress disorder)      CARDIOVASCULAR   (+) Hypertension affecting pregnancy       Anesthetic History   No history of anesthetic complications            Review of Systems / Medical History  Patient summary reviewed, nursing notes reviewed and pertinent labs reviewed    Pulmonary            Asthma   Pertinent negatives: No COPD and recent URI     Neuro/Psych         Psychiatric history (anxiety/BPD)  Pertinent negatives: No seizures and CVA   Cardiovascular    Hypertension (gestational HTN)            Pertinent negatives: No past MI and CHF  Exercise tolerance: >4 METS     GI/Hepatic/Renal     GERD        Pertinent negatives: No liver disease and renal disease   Endo/Other      Hypothyroidism  Morbid obesity  Pertinent negatives: No diabetes   Other Findings   Comments: IOL for preE without severe features            Physical Exam    Airway  Mallampati: III  TM Distance: 4 - 6 cm  Neck ROM: normal range of motion   Mouth opening: Normal     Cardiovascular  Regular rate and rhythm,  S1 and S2 normal,  no murmur, click, rub, or gallop  Rhythm: regular  Rate: normal         Dental  No notable dental hx       Pulmonary  Breath sounds clear to auscultation               Abdominal  GI exam deferred       Other Findings            Anesthetic Plan    ASA: 3  Anesthesia type: CSE      Post-op pain plan if not by surgeon: indwelling epidural catheter      Anesthetic plan and risks discussed with: Patient

## 2022-04-27 NOTE — PROGRESS NOTES
04/27/22 1604   Cervical Exam   Dilation (cm) 3   Eff 80 %   Station -3   Baby Position Vertex     G1 at 37 1/7 weeks induction for pre-eclampsia without severe features s/p CRB/Cytotec and now on Pitocin at 25 mu/min with MVUs 170-270.  She has made cervical change since last exam  -continue Pitocin per unit protocol  -CEFM- Category 1 tracing currently  -montior BPs- all normal to date

## 2022-04-27 NOTE — PROGRESS NOTES
1912: Bedside and Verbal shift change report given to Madina Silva RN and A Alvin RN (oncoming nurse) by Ailene Soulier RN (offgoing nurse). Report included the following information SBAR, Intake/Output, MAR and Recent Results. 0430: Cervical ripening balloon removed     0724: Bedside and Verbal shift change report given to Dean Gabriel RN and Ailene Soulier RN (oncoming nurse) by Ellen Royal RN and Madina Silva RN (offgoing nurse). Report included the following information SBAR, Intake/Output, MAR and Recent Results.

## 2022-04-27 NOTE — PROGRESS NOTES
Labor Progress Note  Patient seen, fetal heart rate and contraction pattern evaluated, patient examined. S/P removal of IC Balloon at 0430 hrs. Pitocin started at 0500 hrs and currently at 7 mU. No data found. Physical Exam:  BPs normal since 9 PM last night, currently 123/77  Cervical Exam:  2 cm dilated    50% effaced    -2 station    Cervical Position: mid position  Consistency: Medium  Membranes:  Intact  Uterine Activity: Frequency: Every 3-5 minutes  Fetal Heart Rate: Reactive  Cat 1    Assessment/Plan:  Labor  Progressing normally, Continue plan for vaginal delivery. Patient bolusing for epidural. After epidural when patient is more comfortable/tolerant of exams, AROM/IUPC.

## 2022-04-27 NOTE — PROGRESS NOTES
04/27/22 1146   Cervical Exam   Dilation (cm) 2   Eff 70 %   Station -3   Baby Position Vertex   G1 37 1/7 wks indxn for pre-eclampsia without severe features s/p CRB/cytotec and now on pitocin.    -IUPC placed to better monitor ctxs- Pitocin per unit protocol  -CEFM- currently Category 1 tracing  -gbs neg  -BPs normal range- continue to monitor

## 2022-04-27 NOTE — PROGRESS NOTES
0715: SBAR shift report received from XOCHILT Estrada RN. Patient care assumed at this time. 0730: Dr. Freda Saldana at bedside. Strip reviewed, SVE performed: 2/50/-3.    6646: Pt. Up to side of bed for epidural. EFM and TOCO intermittently tracing due to maternal position. 0900: Pt. Back to bed, TOCO and EFM adjusted. 3243: Dr. Gregoria Kline at bedside, strip reviewed. SVE performed: 2/50/-3.    1146: Dr. Gregoria Kline at bedside, strip reviewed. SVE performed: 2/70/-3.    1604: Dr. Gregoria Kline at bedside, strip reviewed. SVE performed: 3/80/-3.     1915: SBAR shift report given to FAUSTO Lobo RN. Patient care turned over at this time.

## 2022-04-27 NOTE — PROGRESS NOTES
1915 Report received from SARAH Almonte, ZOE to assume care of patient at this time. 2037 Dr. Yonathan Forman at bedside for evaluation and plan of care  2038 SVE at this time by Dr. Yonathan Forman: 3/70/-2   2044 Verbal orders received from Dr. Yonathan Forman for 30-60 min pitocin break. 7243 Dr. Yonathan Forman at bedside for evaluation and plan of care. 0105 SVE at this time by Dr. Yonathan Forman: unchanged. 0720 Report given to JUSTIN Damian RN to assume care of patient at this time.

## 2022-04-28 PROCEDURE — 74011250637 HC RX REV CODE- 250/637: Performed by: OBSTETRICS & GYNECOLOGY

## 2022-04-28 PROCEDURE — 65410000002 HC RM PRIVATE OB

## 2022-04-28 PROCEDURE — 74011250636 HC RX REV CODE- 250/636: Performed by: NURSE ANESTHETIST, CERTIFIED REGISTERED

## 2022-04-28 PROCEDURE — 76010000392 HC C SECN EA ADDL 0.5 HR: Performed by: OBSTETRICS & GYNECOLOGY

## 2022-04-28 PROCEDURE — 76010000391 HC C SECN FIRST 1 HR: Performed by: OBSTETRICS & GYNECOLOGY

## 2022-04-28 PROCEDURE — 75410000002 HC LABOR FEE PER 1 HR

## 2022-04-28 PROCEDURE — 77010026065 HC OXYGEN MINIMUM MEDICAL AIR

## 2022-04-28 PROCEDURE — 75410000003 HC RECOV DEL/VAG/CSECN EA 0.5 HR

## 2022-04-28 PROCEDURE — 74011000258 HC RX REV CODE- 258: Performed by: OBSTETRICS & GYNECOLOGY

## 2022-04-28 PROCEDURE — 74011250636 HC RX REV CODE- 250/636: Performed by: STUDENT IN AN ORGANIZED HEALTH CARE EDUCATION/TRAINING PROGRAM

## 2022-04-28 PROCEDURE — 76060000033 HC ANESTHESIA 1 TO 1.5 HR: Performed by: OBSTETRICS & GYNECOLOGY

## 2022-04-28 PROCEDURE — 74011000250 HC RX REV CODE- 250: Performed by: NURSE ANESTHETIST, CERTIFIED REGISTERED

## 2022-04-28 PROCEDURE — 74011250637 HC RX REV CODE- 250/637: Performed by: STUDENT IN AN ORGANIZED HEALTH CARE EDUCATION/TRAINING PROGRAM

## 2022-04-28 PROCEDURE — 76060000078 HC EPIDURAL ANESTHESIA: Performed by: OBSTETRICS & GYNECOLOGY

## 2022-04-28 PROCEDURE — 74011250636 HC RX REV CODE- 250/636: Performed by: OBSTETRICS & GYNECOLOGY

## 2022-04-28 PROCEDURE — 74011000250 HC RX REV CODE- 250: Performed by: STUDENT IN AN ORGANIZED HEALTH CARE EDUCATION/TRAINING PROGRAM

## 2022-04-28 PROCEDURE — 74011250636 HC RX REV CODE- 250/636

## 2022-04-28 RX ORDER — OXYTOCIN/RINGER'S LACTATE 30/500 ML
PLASTIC BAG, INJECTION (ML) INTRAVENOUS
Status: DISCONTINUED | OUTPATIENT
Start: 2022-04-28 | End: 2022-04-28 | Stop reason: HOSPADM

## 2022-04-28 RX ORDER — DIPHENHYDRAMINE HYDROCHLORIDE 50 MG/ML
25 INJECTION, SOLUTION INTRAMUSCULAR; INTRAVENOUS ONCE
Status: COMPLETED | OUTPATIENT
Start: 2022-04-28 | End: 2022-04-28

## 2022-04-28 RX ORDER — MORPHINE SULFATE 0.5 MG/ML
INJECTION, SOLUTION EPIDURAL; INTRATHECAL; INTRAVENOUS AS NEEDED
Status: DISCONTINUED | OUTPATIENT
Start: 2022-04-28 | End: 2022-04-28 | Stop reason: HOSPADM

## 2022-04-28 RX ORDER — KETOROLAC TROMETHAMINE 30 MG/ML
30 INJECTION, SOLUTION INTRAMUSCULAR; INTRAVENOUS
Status: DISCONTINUED | OUTPATIENT
Start: 2022-04-28 | End: 2022-05-01 | Stop reason: HOSPADM

## 2022-04-28 RX ORDER — SODIUM CHLORIDE 0.9 % (FLUSH) 0.9 %
5-40 SYRINGE (ML) INJECTION EVERY 8 HOURS
Status: DISCONTINUED | OUTPATIENT
Start: 2022-04-28 | End: 2022-05-01 | Stop reason: HOSPADM

## 2022-04-28 RX ORDER — NALOXONE HYDROCHLORIDE 0.4 MG/ML
0.4 INJECTION, SOLUTION INTRAMUSCULAR; INTRAVENOUS; SUBCUTANEOUS AS NEEDED
Status: DISCONTINUED | OUTPATIENT
Start: 2022-04-28 | End: 2022-05-01 | Stop reason: HOSPADM

## 2022-04-28 RX ORDER — ONDANSETRON 2 MG/ML
4 INJECTION INTRAMUSCULAR; INTRAVENOUS
Status: DISCONTINUED | OUTPATIENT
Start: 2022-04-28 | End: 2022-05-01 | Stop reason: HOSPADM

## 2022-04-28 RX ORDER — DOCUSATE SODIUM 100 MG/1
100 CAPSULE, LIQUID FILLED ORAL 2 TIMES DAILY
Status: DISCONTINUED | OUTPATIENT
Start: 2022-04-28 | End: 2022-05-01 | Stop reason: HOSPADM

## 2022-04-28 RX ORDER — OXYTOCIN/RINGER'S LACTATE 30/500 ML
PLASTIC BAG, INJECTION (ML) INTRAVENOUS
Status: COMPLETED
Start: 2022-04-28 | End: 2022-04-28

## 2022-04-28 RX ORDER — ACETAMINOPHEN 325 MG/1
650 TABLET ORAL
Status: DISCONTINUED | OUTPATIENT
Start: 2022-04-28 | End: 2022-05-01 | Stop reason: HOSPADM

## 2022-04-28 RX ORDER — IBUPROFEN 400 MG/1
800 TABLET ORAL EVERY 8 HOURS
Status: DISCONTINUED | OUTPATIENT
Start: 2022-04-28 | End: 2022-05-01 | Stop reason: HOSPADM

## 2022-04-28 RX ORDER — PHENYLEPHRINE HCL IN 0.9% NACL 0.4MG/10ML
SYRINGE (ML) INTRAVENOUS AS NEEDED
Status: DISCONTINUED | OUTPATIENT
Start: 2022-04-28 | End: 2022-04-28 | Stop reason: HOSPADM

## 2022-04-28 RX ORDER — OXYTOCIN/RINGER'S LACTATE 30/500 ML
10 PLASTIC BAG, INJECTION (ML) INTRAVENOUS AS NEEDED
Status: DISCONTINUED | OUTPATIENT
Start: 2022-04-28 | End: 2022-05-01 | Stop reason: HOSPADM

## 2022-04-28 RX ORDER — SODIUM CHLORIDE, SODIUM LACTATE, POTASSIUM CHLORIDE, CALCIUM CHLORIDE 600; 310; 30; 20 MG/100ML; MG/100ML; MG/100ML; MG/100ML
125 INJECTION, SOLUTION INTRAVENOUS CONTINUOUS
Status: DISCONTINUED | OUTPATIENT
Start: 2022-04-28 | End: 2022-05-01 | Stop reason: HOSPADM

## 2022-04-28 RX ORDER — CLINDAMYCIN PHOSPHATE 900 MG/50ML
900 INJECTION INTRAVENOUS ONCE
Status: COMPLETED | OUTPATIENT
Start: 2022-04-28 | End: 2022-04-28

## 2022-04-28 RX ORDER — ONDANSETRON 2 MG/ML
INJECTION INTRAMUSCULAR; INTRAVENOUS AS NEEDED
Status: DISCONTINUED | OUTPATIENT
Start: 2022-04-28 | End: 2022-04-28 | Stop reason: HOSPADM

## 2022-04-28 RX ORDER — ZOLPIDEM TARTRATE 5 MG/1
5 TABLET ORAL
Status: DISCONTINUED | OUTPATIENT
Start: 2022-04-28 | End: 2022-05-01 | Stop reason: HOSPADM

## 2022-04-28 RX ORDER — LIDOCAINE HYDROCHLORIDE AND EPINEPHRINE 20; 5 MG/ML; UG/ML
INJECTION, SOLUTION EPIDURAL; INFILTRATION; INTRACAUDAL; PERINEURAL AS NEEDED
Status: DISCONTINUED | OUTPATIENT
Start: 2022-04-28 | End: 2022-04-28 | Stop reason: HOSPADM

## 2022-04-28 RX ORDER — SIMETHICONE 80 MG
80 TABLET,CHEWABLE ORAL AS NEEDED
Status: DISCONTINUED | OUTPATIENT
Start: 2022-04-28 | End: 2022-05-01 | Stop reason: HOSPADM

## 2022-04-28 RX ORDER — SODIUM CHLORIDE 0.9 % (FLUSH) 0.9 %
5-40 SYRINGE (ML) INJECTION AS NEEDED
Status: DISCONTINUED | OUTPATIENT
Start: 2022-04-28 | End: 2022-05-01 | Stop reason: HOSPADM

## 2022-04-28 RX ORDER — FOLIC ACID/MULTIVIT,IRON,MINER 0.4MG-18MG
1 TABLET ORAL DAILY
Status: DISCONTINUED | OUTPATIENT
Start: 2022-04-28 | End: 2022-05-01 | Stop reason: HOSPADM

## 2022-04-28 RX ORDER — DIPHENHYDRAMINE HYDROCHLORIDE 50 MG/ML
12.5 INJECTION, SOLUTION INTRAMUSCULAR; INTRAVENOUS
Status: DISCONTINUED | OUTPATIENT
Start: 2022-04-28 | End: 2022-04-28

## 2022-04-28 RX ORDER — OXYCODONE HYDROCHLORIDE 5 MG/1
5-10 TABLET ORAL
Status: DISCONTINUED | OUTPATIENT
Start: 2022-04-28 | End: 2022-04-30

## 2022-04-28 RX ORDER — BUSPIRONE HYDROCHLORIDE 5 MG/1
5 TABLET ORAL 2 TIMES DAILY
Status: DISCONTINUED | OUTPATIENT
Start: 2022-04-28 | End: 2022-05-01 | Stop reason: HOSPADM

## 2022-04-28 RX ORDER — OXYTOCIN/RINGER'S LACTATE 30/500 ML
87.3 PLASTIC BAG, INJECTION (ML) INTRAVENOUS AS NEEDED
Status: COMPLETED | OUTPATIENT
Start: 2022-04-28 | End: 2022-04-28

## 2022-04-28 RX ORDER — ENOXAPARIN SODIUM 100 MG/ML
30 INJECTION SUBCUTANEOUS EVERY 12 HOURS
Status: DISCONTINUED | OUTPATIENT
Start: 2022-04-29 | End: 2022-05-01 | Stop reason: HOSPADM

## 2022-04-28 RX ADMIN — Medication 80 MCG: at 09:49

## 2022-04-28 RX ADMIN — ONDANSETRON HYDROCHLORIDE 4 MG: 2 INJECTION, SOLUTION INTRAMUSCULAR; INTRAVENOUS at 09:58

## 2022-04-28 RX ADMIN — Medication 1 TABLET: at 12:16

## 2022-04-28 RX ADMIN — DOCUSATE SODIUM 100 MG: 100 CAPSULE, LIQUID FILLED ORAL at 12:16

## 2022-04-28 RX ADMIN — Medication 12 ML/HR: at 07:01

## 2022-04-28 RX ADMIN — Medication 909 ML/HR: at 09:59

## 2022-04-28 RX ADMIN — Medication 2.5 MG: at 10:22

## 2022-04-28 RX ADMIN — OXYTOCIN 87.3 MILLI-UNITS/MIN: 10 INJECTION INTRAVENOUS at 10:52

## 2022-04-28 RX ADMIN — Medication 120 MCG: at 09:59

## 2022-04-28 RX ADMIN — AZITHROMYCIN MONOHYDRATE 500 MG: 500 INJECTION, POWDER, LYOPHILIZED, FOR SOLUTION INTRAVENOUS at 09:13

## 2022-04-28 RX ADMIN — SODIUM CHLORIDE, POTASSIUM CHLORIDE, SODIUM LACTATE AND CALCIUM CHLORIDE 125 ML/HR: 600; 310; 30; 20 INJECTION, SOLUTION INTRAVENOUS at 04:35

## 2022-04-28 RX ADMIN — LIDOCAINE HYDROCHLORIDE,EPINEPHRINE BITARTRATE 10 ML: 20; .005 INJECTION, SOLUTION EPIDURAL; INFILTRATION; INTRACAUDAL; PERINEURAL at 09:25

## 2022-04-28 RX ADMIN — LEVOTHYROXINE SODIUM 75 MCG: 0.05 TABLET ORAL at 07:03

## 2022-04-28 RX ADMIN — Medication 120 MCG: at 09:45

## 2022-04-28 RX ADMIN — Medication 87.3 MILLI-UNITS/MIN: at 10:52

## 2022-04-28 RX ADMIN — LIDOCAINE HYDROCHLORIDE,EPINEPHRINE BITARTRATE 5 ML: 20; .005 INJECTION, SOLUTION EPIDURAL; INFILTRATION; INTRACAUDAL; PERINEURAL at 09:40

## 2022-04-28 RX ADMIN — BUSPIRONE HYDROCHLORIDE 5 MG: 5 TABLET ORAL at 22:21

## 2022-04-28 RX ADMIN — BUSPIRONE HYDROCHLORIDE 5 MG: 5 TABLET ORAL at 12:16

## 2022-04-28 RX ADMIN — SODIUM CHLORIDE, POTASSIUM CHLORIDE, SODIUM LACTATE AND CALCIUM CHLORIDE 125 ML/HR: 600; 310; 30; 20 INJECTION, SOLUTION INTRAVENOUS at 12:38

## 2022-04-28 RX ADMIN — DOCUSATE SODIUM 100 MG: 100 CAPSULE, LIQUID FILLED ORAL at 22:21

## 2022-04-28 RX ADMIN — IBUPROFEN 800 MG: 400 TABLET, FILM COATED ORAL at 19:51

## 2022-04-28 RX ADMIN — CLINDAMYCIN IN 5 PERCENT DEXTROSE 900 MG: 18 INJECTION, SOLUTION INTRAVENOUS at 09:13

## 2022-04-28 RX ADMIN — DIPHENHYDRAMINE HYDROCHLORIDE 25 MG: 50 INJECTION, SOLUTION INTRAMUSCULAR; INTRAVENOUS at 14:09

## 2022-04-28 RX ADMIN — Medication 120 MCG: at 10:02

## 2022-04-28 RX ADMIN — GENTAMICIN SULFATE 400 MG: 40 INJECTION, SOLUTION INTRAMUSCULAR; INTRAVENOUS at 12:31

## 2022-04-28 NOTE — L&D DELIVERY NOTE
Delivery Summary  Patient: Alfie Lennon             Circumcision:   NA-female  Additional Delivery Comments - 36 y.o.  at 37w2d underwent pLTCS for failed IOL in setting of preeclampsia without severe features. Pls see op report for more details.       Information for the patient's :  Dixie Pelaez [[id number]]     Delivery Type:     Delivery Date: 2022   Delivery Time: 9:58 AM     Birth Weight: 2.71 kg     Sex:  female  Delivery Clinician:      Gestational Age: 42w2d    Presentation:     Position:             Apgars were 8  and 9      Resuscitation Method: Tactile Stimulation;Suctioning-bulb     Meconium Stained:      Living Status: Living       Placenta Date/Time:     Placenta Removal:     Placenta Appearance:      Cord Information: 3 Vessels    Cord Events:         Disposition of Cord Blood:      Blood Gases Sent?:          Cord pH:  none    Episiotomy:    Laceration(s):      Estimated Blood Loss (ml): No data found    Labor Events  Method:       Augmentation:    Cervical Ripening:             Operative Vaginal Delivery - none

## 2022-04-28 NOTE — PROGRESS NOTES
Labor Progress Note  Patient seen, fetal heart rate and contraction pattern evaluated. Patient reports she remains comfortable. Physical Exam:  Visit Vitals  /73   Pulse (!) 108   Temp 98 °F (36.7 °C)   Resp 18   Ht 5' 3\" (1.6 m)   Wt 121.6 kg (268 lb)   SpO2 (!) 89%   BMI 47.47 kg/m²     Cervical Exam: 3-4/70/-3  Membranes:  ruptured  Uterine Activity: Frequency: 2-3 minutes  Fetal Heart Rate: 140,  adequate variability and reactivity  Accelerations: yes  Decelerations: none  Pitocin at 25 miu/min    Assessment/Plan:  Reassuring fetal status. Pitocin was stopped and restarted to evaluate for a better response, but there has been a poor response in achieving adequate contractions. Discussed with the patient options going forward, including continuing with pitocin as well as possible CS. Questions answered.     Ame Dietrich MD

## 2022-04-28 NOTE — LACTATION NOTE
This note was copied from a baby's chart. 22 1130   Visit Information   Lactation Consult Visit Type IP Initial Consult   Visit Length 30 minutes   Reason for Visit Education;Normal [place] Visit   Breast- Feeding Assessment   Attends Breast-Feeding Classes No   Breast-Feeding Experience No   Breast Assessment   Left Breast Large  (Colostrum Hand Expressed)   Left Nipple Everted  (Med/Large)   Right Breast Large  (Colostrum Hand Expressed)   Right Nipple Everted  (Medium/Large)   Mother/Infant Observation   Mother Observation Breast comfortable;Close hold;Cramps   Infant Observation Breast tissue moves; Feeding cues; Latches nipple and aereolae;Lips flanged, lower; Lips flanged, upper;Opens mouth   LATCH Documentation   Latch 2   Audible Swallowing 1   Type of Nipple 2   Comfort (Breast/Nipple) 2   Hold (Positioning) 0   LATCH Score 7     Reviewed the \"Your Guide to Breastfeeding\" booklet. Discussed the typical feeding characteristics in the 1st and 2nd DOL and signs of adequate intake. Baby is about 2 hours old at the time of this assessment. Mother can easily hand express colostrum. Baby latching well with mother in the laid back position and showing good signs of transfer. Discussed a feeding plan with mother. She is interested in DM supplementation should her baby show signs of inadequate intake during the PP stay. Her questions were addressed. Mother has a Momcozy double electric breast pump at home. Plan:  Offer lots of skin to skin and access to the breast.  Feed baby at early signs of hunger every 2-2 1/2 hours. Pump/hand express for poor feeds and offer baby EBM. Monitor wet and dirty diapers for signs of adequate intake.

## 2022-04-28 NOTE — PROGRESS NOTES
Labor Progress Note  Patient seen and evaluated, chart reviewed, FHR tracing reviewed. Patient comfortable with epidural.    FHR reactive and reassuring. Discussed with patient and her partner that at this time she has been ruptured for just shy of 24 hours with oxytocin administration throughout this time. IUPC has been in place and patient has not been able to achieve adequate contractions. Discussed that at this time she meets criteria for failed IOL, and we discussed options of attempting to continue vs my recommendation for . We discussed risks of  including pain, bleeding, infection, damage to adjacent structures/organs, need for further surgery, DVT/PE, hysterectomy, and death. All questions were answered. Pt consents to blood transfusion in an emergency. Pit turned off. Will proceed to OR pending anesthesiology eval and OR availability.     Kwaku Morillo MD  2022  8:56 AM

## 2022-04-28 NOTE — PROGRESS NOTES
0720: Bedside shift change report given to JUSTIN Copeland (oncoming nurse) by Zahira Hdz RN (offgoing nurse). Report included the following information SBAR and Kardex. 0845: C/section called. 1400: TRANSFER - OUT REPORT:    Verbal report given to HONORIO Ruiz RN (name) on [employer]  being transferred to MIU (unit) for routine progression of care       Report consisted of patients Situation, Background, Assessment and   Recommendations(SBAR). Information from the following report(s) SBAR and Kardex was reviewed with the receiving nurse. Lines:   Peripheral IV 04/26/22 Left;Posterior Hand (Active)   Site Assessment Clean, dry, & intact 04/28/22 0733   Phlebitis Assessment 0 04/28/22 0733   Infiltration Assessment 0 04/28/22 0733   Dressing Status Clean, dry, & intact 04/28/22 0733   Dressing Type Transparent;Tape 04/28/22 0733   Hub Color/Line Status Pink 04/28/22 0733   Action Taken Blood drawn 04/26/22 1600   Alcohol Cap Used Yes 04/26/22 1600        Opportunity for questions and clarification was provided.       Patient transported with:   Registered Nurse

## 2022-04-28 NOTE — ANESTHESIA POSTPROCEDURE EVALUATION
Procedure(s):   SECTION. CSE    Anesthesia Post Evaluation      Multimodal analgesia: multimodal analgesia used between 6 hours prior to anesthesia start to PACU discharge  Patient location during evaluation: bedside  Patient participation: complete - patient participated  Level of consciousness: awake  Pain management: adequate  Airway patency: patent  Anesthetic complications: no  Cardiovascular status: acceptable  Respiratory status: acceptable  Hydration status: acceptable  Post anesthesia nausea and vomiting:  controlled  Final Post Anesthesia Temperature Assessment:  Normothermia (36.0-37.5 degrees C)      INITIAL Post-op Vital signs:   Vitals Value Taken Time   BP     Temp     Pulse     Resp     SpO2 97 % 22 1119   Vitals shown include unvalidated device data.

## 2022-04-28 NOTE — PROGRESS NOTES
P&T-Approved DVT Prophylaxis Dosing    Per P&T Committee-approved protocol enoxaparin 40 mg daily has been adjusted to enoxaparin 30 mg twice daily based on weight and renal function as shown in the table below.          BYRON Brewer

## 2022-04-28 NOTE — PROGRESS NOTES
Labor Progress Note  Patient seen, fetal heart rate and contraction pattern evaluated. Patient reports she is comfortable with epidural in place. Physical Exam:  Visit Vitals  /70   Pulse (!) 130   Temp 98.5 °F (36.9 °C)   Resp 16   Ht 5' 3\" (1.6 m)   Wt 121.6 kg (268 lb)   SpO2 98%   BMI 47.47 kg/m²     Cervical Exam: 3/70/-2  Membranes:  ruptured  Uterine Activity: Frequency: 2-3 minutes; MVUs less than 200  Fetal Heart Rate: 120 - 130,  adequate variability and reactivity  Accelerations: yes  Decelerations: none  Pitocin at 27 miu/min    Assessment/Plan:  Reassuring fetal status. No significant progress with inadequate contractions  Will stop pitocin, rest 1 hour, then restart. Discussed with patient and spouse.     Chel Bergman MD

## 2022-04-28 NOTE — OP NOTES
Operative Note  Patient - [facility] [place] Record Number - [id number]   YOB: 1990      DATE AND TIME OF PROCEDURE: 22    10:37 AM     PREOPERATIVE DIAGNOSIS:   1) IUP at 37w2d  2) preeclampsia without severe features  3) failed induction of labor     POSTOPERATIVE DIAGNOSIS: same, delivered    PROCEDURE(S): Procedure(s):   SECTION primary low transverse  section    ANESTHESIA: Spinal    SURGEON:  MD Aminata Marte MD    ESTIMATED BLOOD LOSS AT PROCEDURE END: 531VI     COMPLICATIONS: none    IMPLANTS: *No implants in the log*    SPECIMENS: placenta, discarded    FINDINGS: vertex female fetus, nuchal cord x1 reduced with delivery      Prophylactic Antibiotics: gentamicin, clindamycin, and azithromycin     DVT Prophylaxis: Sequential Compression Devices         Fetal Description: frias     Birth Information:   Information for the patient's :  Claudia Truong [[id number]]   Delivery of a 2.71 kg female infant on 2022 at 9:58 AM. Apgars were 8  and 9 . Umbilical Cord: 3 Vessels     Umbilical Cord Events:       Placenta:   removal with   appearance. Amniotic Fluid Volume:        Amniotic Fluid Description:  Clear        Umbilical Cord: Nuchal Cord x  1    Placenta:  expressed        Procedure Detail:      After proper patient identification and consent, the patient was taken to the operating room, where epidural anesthesia was administered and found to be adequate. Hicks catheter had been placed using sterile technique. The patient was prepped and draped in the normal sterile fashion. The abdomen was entered using the Pfannenstiel technique. The peritoneum was entered bluntly well superior to the bladder without any apparent injury. A low transverse uterine incision was made with the scalpel and extended craniocaudally with blunt finger dissection.  Amniotomy was performed and the fluid was small amount clear.  The babys head was then delivered atraumatically, nuchal cord x1 reduced with ease. The nose and mouth were suctioned. The cord was clamped after 60 seconds and cut and the baby was handed off to Nursing staff in attendance. Placenta was then removed from the uterus. The uterus was curettaged with a moist lap pad and cleared of all clots and debris. The uterine incision was closed with 0 vicryl, double layer  in running locking fashion and second imbricating layer with good hemostasis assured. The fascia was examined and noted to be intact without defects, and was closed with 0 Vicryl in a running fashion. Good hemostasis was assured. The subcutaneous tissue was closed with 3-0 vicryl, and the skin was closed with the Insorb subcuticular stapler, and dressed with MARTA dressing. The patient tolerated the procedure well. Sponge, lap, and needle counts were correct times three and the patient and baby were taken to recovery/postpartum room in stable condition.     Gay Litten, MD  April 28, 2022  10:37 AM

## 2022-04-29 LAB
BASOPHILS # BLD: 0 K/UL (ref 0–0.1)
BASOPHILS NFR BLD: 0 % (ref 0–1)
DIFFERENTIAL METHOD BLD: ABNORMAL
EOSINOPHIL # BLD: 0 K/UL (ref 0–0.4)
EOSINOPHIL NFR BLD: 0 % (ref 0–7)
ERYTHROCYTE [DISTWIDTH] IN BLOOD BY AUTOMATED COUNT: 14.2 % (ref 11.5–14.5)
HCT VFR BLD AUTO: 32.1 % (ref 35–47)
HGB BLD-MCNC: 10.2 G/DL (ref 11.5–16)
IMM GRANULOCYTES # BLD AUTO: 0.2 K/UL (ref 0–0.04)
IMM GRANULOCYTES NFR BLD AUTO: 1 % (ref 0–0.5)
LYMPHOCYTES # BLD: 1.4 K/UL (ref 0.8–3.5)
LYMPHOCYTES NFR BLD: 8 % (ref 12–49)
MCH RBC QN AUTO: 24.8 PG (ref 26–34)
MCHC RBC AUTO-ENTMCNC: 31.8 G/DL (ref 30–36.5)
MCV RBC AUTO: 78.1 FL (ref 80–99)
MONOCYTES # BLD: 1.5 K/UL (ref 0–1)
MONOCYTES NFR BLD: 9 % (ref 5–13)
NEUTS SEG # BLD: 14.6 K/UL (ref 1.8–8)
NEUTS SEG NFR BLD: 82 % (ref 32–75)
NRBC # BLD: 0 K/UL (ref 0–0.01)
NRBC BLD-RTO: 0 PER 100 WBC
PLATELET # BLD AUTO: 308 K/UL (ref 150–400)
PMV BLD AUTO: 10.1 FL (ref 8.9–12.9)
RBC # BLD AUTO: 4.11 M/UL (ref 3.8–5.2)
WBC # BLD AUTO: 17.7 K/UL (ref 3.6–11)

## 2022-04-29 PROCEDURE — 74011250636 HC RX REV CODE- 250/636: Performed by: OBSTETRICS & GYNECOLOGY

## 2022-04-29 PROCEDURE — 85025 COMPLETE CBC W/AUTO DIFF WBC: CPT

## 2022-04-29 PROCEDURE — 65410000002 HC RM PRIVATE OB

## 2022-04-29 PROCEDURE — 74011250637 HC RX REV CODE- 250/637: Performed by: OBSTETRICS & GYNECOLOGY

## 2022-04-29 PROCEDURE — 36415 COLL VENOUS BLD VENIPUNCTURE: CPT

## 2022-04-29 RX ADMIN — OXYCODONE 10 MG: 5 TABLET ORAL at 18:08

## 2022-04-29 RX ADMIN — BUSPIRONE HYDROCHLORIDE 5 MG: 5 TABLET ORAL at 18:08

## 2022-04-29 RX ADMIN — OXYCODONE 10 MG: 5 TABLET ORAL at 22:36

## 2022-04-29 RX ADMIN — ENOXAPARIN SODIUM 30 MG: 100 INJECTION SUBCUTANEOUS at 21:57

## 2022-04-29 RX ADMIN — IBUPROFEN 800 MG: 400 TABLET, FILM COATED ORAL at 21:58

## 2022-04-29 RX ADMIN — OXYCODONE 5 MG: 5 TABLET ORAL at 09:10

## 2022-04-29 RX ADMIN — IBUPROFEN 800 MG: 400 TABLET, FILM COATED ORAL at 14:06

## 2022-04-29 RX ADMIN — OXYCODONE 10 MG: 5 TABLET ORAL at 13:04

## 2022-04-29 RX ADMIN — DOCUSATE SODIUM 100 MG: 100 CAPSULE, LIQUID FILLED ORAL at 18:08

## 2022-04-29 RX ADMIN — Medication 1 TABLET: at 09:11

## 2022-04-29 RX ADMIN — BUSPIRONE HYDROCHLORIDE 5 MG: 5 TABLET ORAL at 09:10

## 2022-04-29 RX ADMIN — IBUPROFEN 800 MG: 400 TABLET, FILM COATED ORAL at 05:24

## 2022-04-29 RX ADMIN — LEVOTHYROXINE SODIUM 75 MCG: 0.05 TABLET ORAL at 07:33

## 2022-04-29 RX ADMIN — ENOXAPARIN SODIUM 30 MG: 100 INJECTION SUBCUTANEOUS at 09:11

## 2022-04-29 RX ADMIN — DOCUSATE SODIUM 100 MG: 100 CAPSULE, LIQUID FILLED ORAL at 09:11

## 2022-04-29 NOTE — LACTATION NOTE
22 1145   Visit Information   Lactation Consult Visit Type IP Consult Follow Up   Visit Length 60 minutes   Reason for Visit Education;Normal [place] Visit   Breast- Feeding Assessment   Attends Breast-Feeding Classes No   Breast-Feeding Experience No   Breast Assessment   Left Breast Large  (Colostrum Hand Expressed)   Left Nipple Everted  (Medium/Large)   Right Breast Large  (Colostrum Hand Expressed)   Right Nipple   (Medium/Large)   Mother/Infant Observation   Mother Observation Close hold   Infant Observation Frenulum checked; Baby has a recessed chin and narrow gape; Having difficulty getting a deep latch   LATCH Documentation   Latch 1   Audible Swallowing 0   Type of Nipple 2  (Medium/Large)   Comfort (Breast/Nipple) 2   Hold (Positioning) 0   LATCH Score 5       Observed that baby has a recessed chin and narrow gape. Mother having difficulty obtaining a deep latch. Discussed the supply and demand concept of breast milk production and that due to baby's shallow latch she may not stimulate mother's supply or transfer well. Recommended pumping to stimulate mother's supply and supplementing with DM. Mother consented to DM. Observed mother pumping using Symphony breast pump (Mother has a Momcozy double pump at home). Discussed a feeding plan and parents were given the opportunity to ask questions. Plan:  Offer lots of skin to skin and access to the breast.  Feed baby at early signs of hunger. Offer baby a supplement of DM at each feeding. Pump breasts for 15 minutes. Monitor wet and dirty diapers for signs of adequate intake.

## 2022-04-29 NOTE — PROGRESS NOTES
Post-Operative  Day 1    Lauren WORRELL Richie     Assessment: Post-Op day 1, stable    Plan:     - Routine post-operative care. - Postop hemoglobin stable. Plan to start Fe at discharge. - hypothyroid - continue synthroid, follow up in 6 weeks  - depression/bipolar - continue buspar. Declines need to start zoloft. 1 week mood check  - preE without SF - bps normal post delivery. No meds. Labs normal on admit. Information for the patient's :  aCndace Rivera [[id number]]   , Low Transverse     Patient doing well without significant complaint. Tolerating diet. Hicks out. Ambulating. Vitals:  Visit Vitals  /80   Pulse (!) 106   Temp 97.5 °F (36.4 °C)   Resp 16   Ht 5' 3\" (1.6 m)   Wt 121.6 kg (268 lb)   SpO2 96%   Breastfeeding Unknown   BMI 47.47 kg/m²     Temp (24hrs), Av °F (36.7 °C), Min:97.5 °F (36.4 °C), Max:98.6 °F (37 °C)      Last 24hr Input/Output:    Intake/Output Summary (Last 24 hours) at 2022 1111  Last data filed at 2022 0515  Gross per 24 hour   Intake    Output 3661 ml   Net -3661 ml          Exam:     Patient without distress. Fundus firm, nontender per nursing fundal checks. Incision bandaged, clean, dry, intact. Perineum with normal lochia noted per nursing assessment. Lower extremities are negative for pathological edema.     Labs:   Lab Results   Component Value Date/Time    WBC 17.7 (H) 2022 04:18 AM    WBC 15.7 (H) 2022 03:38 PM    WBC 12.2 (H) 2022 04:46 AM    WBC 15.6 (H) 2022 10:57 AM    WBC 9.1 2015 05:40 PM    HGB 10.2 (L) 2022 04:18 AM    HGB 11.2 (L) 2022 03:38 PM    HGB 10.3 (L) 2022 04:46 AM    HGB 11.5 2022 10:57 AM    HGB 14.6 2015 05:40 PM    HCT 32.1 (L) 2022 04:18 AM    HCT 34.7 (L) 2022 03:38 PM    HCT 32.0 (L) 2022 04:46 AM    HCT 35.7 2022 10:57 AM    HCT 42.9 2015 05:40 PM    PLATELET 228 04/29/2022 04:18 AM    PLATELET 028 47/87/5092 03:38 PM    PLATELET 035 82/76/5919 04:46 AM    PLATELET 545 02/84/9809 10:57 AM    PLATELET 729 97/77/9745 05:40 PM       Recent Results (from the past 24 hour(s))   CBC WITH AUTOMATED DIFF    Collection Time: 04/29/22  4:18 AM   Result Value Ref Range    WBC 17.7 (H) 3.6 - 11.0 K/uL    RBC 4.11 3.80 - 5.20 M/uL    HGB 10.2 (L) 11.5 - 16.0 g/dL    HCT 32.1 (L) 35.0 - 47.0 %    MCV 78.1 (L) 80.0 - 99.0 FL    MCH 24.8 (L) 26.0 - 34.0 PG    MCHC 31.8 30.0 - 36.5 g/dL    RDW 14.2 11.5 - 14.5 %    PLATELET 011 052 - 602 K/uL    MPV 10.1 8.9 - 12.9 FL    NRBC 0.0 0  WBC    ABSOLUTE NRBC 0.00 0.00 - 0.01 K/uL    NEUTROPHILS 82 (H) 32 - 75 %    LYMPHOCYTES 8 (L) 12 - 49 %    MONOCYTES 9 5 - 13 %    EOSINOPHILS 0 0 - 7 %    BASOPHILS 0 0 - 1 %    IMMATURE GRANULOCYTES 1 (H) 0.0 - 0.5 %    ABS. NEUTROPHILS 14.6 (H) 1.8 - 8.0 K/UL    ABS. LYMPHOCYTES 1.4 0.8 - 3.5 K/UL    ABS. MONOCYTES 1.5 (H) 0.0 - 1.0 K/UL    ABS. EOSINOPHILS 0.0 0.0 - 0.4 K/UL    ABS. BASOPHILS 0.0 0.0 - 0.1 K/UL    ABS. IMM.  GRANS. 0.2 (H) 0.00 - 0.04 K/UL    DF AUTOMATED

## 2022-04-29 NOTE — ROUTINE PROCESS
Bedside and Verbal shift change report given to Fabby Briceño (oncoming nurse) by Salima Ocampo (offgoing nurse). Report included the following information SBAR, Kardex, Intake/Output, MAR and Recent Results.

## 2022-04-29 NOTE — ROUTINE PROCESS
Bedside and Verbal shift change report given to FAUSTO Ocampo RN (oncoming nurse) by ZENIA Barragan RN (offgoing nurse). Report included the following information SBAR, Kardex, Intake/Output, MAR and Recent Results.

## 2022-04-30 PROCEDURE — 74011250636 HC RX REV CODE- 250/636: Performed by: OBSTETRICS & GYNECOLOGY

## 2022-04-30 PROCEDURE — 74011250637 HC RX REV CODE- 250/637: Performed by: OBSTETRICS & GYNECOLOGY

## 2022-04-30 PROCEDURE — 65410000002 HC RM PRIVATE OB

## 2022-04-30 RX ORDER — OXYCODONE HYDROCHLORIDE 5 MG/1
10 TABLET ORAL
Status: DISCONTINUED | OUTPATIENT
Start: 2022-04-30 | End: 2022-05-01 | Stop reason: HOSPADM

## 2022-04-30 RX ORDER — OXYCODONE HYDROCHLORIDE 5 MG/1
5 TABLET ORAL
Status: DISCONTINUED | OUTPATIENT
Start: 2022-04-30 | End: 2022-05-01 | Stop reason: HOSPADM

## 2022-04-30 RX ADMIN — IBUPROFEN 800 MG: 400 TABLET, FILM COATED ORAL at 06:58

## 2022-04-30 RX ADMIN — DOCUSATE SODIUM 100 MG: 100 CAPSULE, LIQUID FILLED ORAL at 08:07

## 2022-04-30 RX ADMIN — ACETAMINOPHEN 325MG 650 MG: 325 TABLET ORAL at 22:06

## 2022-04-30 RX ADMIN — BUSPIRONE HYDROCHLORIDE 5 MG: 5 TABLET ORAL at 17:48

## 2022-04-30 RX ADMIN — ENOXAPARIN SODIUM 30 MG: 100 INJECTION SUBCUTANEOUS at 20:41

## 2022-04-30 RX ADMIN — OXYCODONE 10 MG: 5 TABLET ORAL at 20:41

## 2022-04-30 RX ADMIN — OXYCODONE 10 MG: 5 TABLET ORAL at 11:15

## 2022-04-30 RX ADMIN — LEVOTHYROXINE SODIUM 75 MCG: 0.05 TABLET ORAL at 08:07

## 2022-04-30 RX ADMIN — ACETAMINOPHEN 325MG 650 MG: 325 TABLET ORAL at 17:47

## 2022-04-30 RX ADMIN — ENOXAPARIN SODIUM 30 MG: 100 INJECTION SUBCUTANEOUS at 08:07

## 2022-04-30 RX ADMIN — DOCUSATE SODIUM 100 MG: 100 CAPSULE, LIQUID FILLED ORAL at 17:48

## 2022-04-30 RX ADMIN — Medication 1 TABLET: at 08:07

## 2022-04-30 RX ADMIN — OXYCODONE 10 MG: 5 TABLET ORAL at 06:58

## 2022-04-30 RX ADMIN — OXYCODONE 10 MG: 5 TABLET ORAL at 02:19

## 2022-04-30 RX ADMIN — BUSPIRONE HYDROCHLORIDE 5 MG: 5 TABLET ORAL at 08:07

## 2022-04-30 RX ADMIN — ACETAMINOPHEN 325MG 650 MG: 325 TABLET ORAL at 08:06

## 2022-04-30 RX ADMIN — IBUPROFEN 800 MG: 400 TABLET, FILM COATED ORAL at 16:09

## 2022-04-30 RX ADMIN — ACETAMINOPHEN 325MG 650 MG: 325 TABLET ORAL at 13:20

## 2022-04-30 RX ADMIN — OXYCODONE 5 MG: 5 TABLET ORAL at 20:55

## 2022-04-30 RX ADMIN — OXYCODONE 10 MG: 5 TABLET ORAL at 16:09

## 2022-04-30 NOTE — PROGRESS NOTES
Post-Operative  Day 2    Lauren Quiroz     Assessment: Post-Op day 2, stable    Plan:     - Routine post-operative care. Add po med for breakthrough pain. - Postop hemoglobin stable. Plan to start Fe at discharge. - hypothyroid - continue synthroid, follow up in 6 weeks  - depression/bipolar - continue buspar. Declines need to start zoloft. 1 week mood check  - preE without SF - bps normal post delivery. No meds. Labs normal on admit. Information for the patient's :  Kita Vega [[id number]]   , Low Transverse     Patient doing well without significant complaint. Tolerating diet. Hicks out. Ambulating. Vitals:  Visit Vitals  /60 (BP 1 Location: Left upper arm, BP Patient Position: At rest)   Pulse (!) 104   Temp 97.7 °F (36.5 °C)   Resp 18   Ht 5' 3\" (1.6 m)   Wt 121.6 kg (268 lb)   SpO2 98%   Breastfeeding Unknown   BMI 47.47 kg/m²     Temp (24hrs), Av.8 °F (36.6 °C), Min:97.7 °F (36.5 °C), Max:98 °F (36.7 °C)      Last 24hr Input/Output:    Intake/Output Summary (Last 24 hours) at 2022 1315  Last data filed at 2022 1405  Gross per 24 hour   Intake    Output 300 ml   Net -300 ml          Exam:     Patient without distress. Fundus firm, nontender per nursing fundal checks. Incision bandaged, clean, dry, intact. Perineum with normal lochia noted per nursing assessment. Lower extremities are negative for pathological edema.     Labs:   Lab Results   Component Value Date/Time    WBC 17.7 (H) 2022 04:18 AM    WBC 15.7 (H) 2022 03:38 PM    WBC 12.2 (H) 2022 04:46 AM    WBC 15.6 (H) 2022 10:57 AM    WBC 9.1 2015 05:40 PM    HGB 10.2 (L) 2022 04:18 AM    HGB 11.2 (L) 2022 03:38 PM    HGB 10.3 (L) 2022 04:46 AM    HGB 11.5 2022 10:57 AM    HGB 14.6 2015 05:40 PM    HCT 32.1 (L) 2022 04:18 AM    HCT 34.7 (L) 2022 03:38 PM    HCT 32.0 (L) 04/24/2022 04:46 AM    HCT 35.7 04/22/2022 10:57 AM    HCT 42.9 12/21/2015 05:40 PM    PLATELET 362 88/89/2809 04:18 AM    PLATELET 111 69/17/2988 03:38 PM    PLATELET 575 24/45/1297 04:46 AM    PLATELET 421 79/89/1070 10:57 AM    PLATELET 494 70/54/6997 05:40 PM       No results found for this or any previous visit (from the past 24 hour(s)).

## 2022-04-30 NOTE — ROUTINE PROCESS
Bedside and Verbal shift change report given to Jamia Alvares RN (oncoming nurse) by DION Henry RN (offgoing nurse). Report included the following information SBAR, Procedure Summary, Intake/Output, MAR and Recent Results.

## 2022-05-01 VITALS
HEIGHT: 63 IN | OXYGEN SATURATION: 97 % | WEIGHT: 268 LBS | RESPIRATION RATE: 18 BRPM | TEMPERATURE: 97.9 F | BODY MASS INDEX: 47.48 KG/M2 | DIASTOLIC BLOOD PRESSURE: 97 MMHG | HEART RATE: 88 BPM | SYSTOLIC BLOOD PRESSURE: 99 MMHG

## 2022-05-01 PROCEDURE — 74011250637 HC RX REV CODE- 250/637: Performed by: OBSTETRICS & GYNECOLOGY

## 2022-05-01 PROCEDURE — 74011250636 HC RX REV CODE- 250/636: Performed by: OBSTETRICS & GYNECOLOGY

## 2022-05-01 RX ORDER — IBUPROFEN 800 MG/1
800 TABLET ORAL EVERY 8 HOURS
Qty: 30 TABLET | Refills: 1 | Status: SHIPPED | OUTPATIENT
Start: 2022-05-01

## 2022-05-01 RX ORDER — OXYCODONE AND ACETAMINOPHEN 5; 325 MG/1; MG/1
1-2 TABLET ORAL
Qty: 30 TABLET | Refills: 0 | Status: SHIPPED | OUTPATIENT
Start: 2022-05-01 | End: 2022-05-04

## 2022-05-01 RX ADMIN — IBUPROFEN 800 MG: 400 TABLET, FILM COATED ORAL at 00:39

## 2022-05-01 RX ADMIN — ACETAMINOPHEN 325MG 650 MG: 325 TABLET ORAL at 06:01

## 2022-05-01 RX ADMIN — OXYCODONE 10 MG: 5 TABLET ORAL at 00:39

## 2022-05-01 RX ADMIN — ACETAMINOPHEN 325MG 650 MG: 325 TABLET ORAL at 14:13

## 2022-05-01 RX ADMIN — OXYCODONE 10 MG: 5 TABLET ORAL at 04:57

## 2022-05-01 RX ADMIN — IBUPROFEN 800 MG: 400 TABLET, FILM COATED ORAL at 08:19

## 2022-05-01 RX ADMIN — LEVOTHYROXINE SODIUM 75 MCG: 0.05 TABLET ORAL at 08:19

## 2022-05-01 RX ADMIN — BUSPIRONE HYDROCHLORIDE 5 MG: 5 TABLET ORAL at 08:19

## 2022-05-01 RX ADMIN — ENOXAPARIN SODIUM 30 MG: 100 INJECTION SUBCUTANEOUS at 08:19

## 2022-05-01 RX ADMIN — Medication 1 TABLET: at 08:19

## 2022-05-01 RX ADMIN — DOCUSATE SODIUM 100 MG: 100 CAPSULE, LIQUID FILLED ORAL at 08:19

## 2022-05-01 RX ADMIN — ACETAMINOPHEN 325MG 650 MG: 325 TABLET ORAL at 10:07

## 2022-05-01 RX ADMIN — OXYCODONE 10 MG: 5 TABLET ORAL at 14:13

## 2022-05-01 RX ADMIN — OXYCODONE 10 MG: 5 TABLET ORAL at 10:07

## 2022-05-01 RX ADMIN — ACETAMINOPHEN 325MG 650 MG: 325 TABLET ORAL at 02:03

## 2022-05-01 NOTE — DISCHARGE SUMMARY
Obstetrical Discharge Summary     Name: Herminia Jordan MRN: [id number]  SSN: [id number]    YOB: 1990  Age: 36 y.o. Sex: female      Admit Date: 2022    Discharge Date: 2022     Admitting Physician: Can Black MD     Attending Physician:  Pardeep Chen MD     Admission Diagnoses: Pregnant [Z34.90]    Discharge Diagnoses:   Information for the patient's :  Kei Cedeño [[id number]]   Delivery of a 2.71 kg female infant via , Low Transverse on 2022 at 9:58 AM  by Bean Begum. Apgars were 8  and 9 . Additional Diagnoses:   Hospital Problems  Date Reviewed: 2015    None         Lab Results   Component Value Date/Time    Rubella, External Immune 2021 12:00 AM    GrBStrep, External Negative  2022 12:00 AM       Hospital Course: Normal hospital course following the delivery. Patient Instructions:   Current Discharge Medication List      START taking these medications    Details   ibuprofen (MOTRIN) 800 mg tablet Take 1 Tablet by mouth every eight (8) hours. Qty: 30 Tablet, Refills: 1      oxyCODONE-acetaminophen (Percocet) 5-325 mg per tablet Take 1-2 Tablets by mouth every six (6) hours as needed for Pain for up to 3 days. Max Daily Amount: 8 Tablets. Qty: 30 Tablet, Refills: 0    Associated Diagnoses: Post-op pain         CONTINUE these medications which have NOT CHANGED    Details   busPIRone (BUSPAR) 10 mg tablet Take 5 mg by mouth two (2) times a day. ondansetron hcl (Zofran) 4 mg tablet Take 4 mg by mouth every eight (8) hours as needed for Nausea or Vomiting.      levothyroxine (Synthroid) 75 mcg tablet Take 75 mcg by mouth Daily (before breakfast). PNV Comb #2-Iron-FA-Omega 3 29-1-400 mg cmpk Take  by mouth.          STOP taking these medications       promethazine (PHENERGAN) 12.5 mg tablet Comments:   Reason for Stopping:         aspirin 81 mg chewable tablet Comments:   Reason for Stopping: PARoxetine (PAXIL) 40 mg tablet Comments:   Reason for Stopping:               Disposition at Discharge: Home or self care    Condition at Discharge: Stable    Reference my discharge instructions. Follow-up Appointments   Procedures    FOLLOW UP VISIT Appointment in: One Week For mood and bp check     For mood and bp check     Standing Status:   Standing     Number of Occurrences:   1     Order Specific Question:   Appointment in     Answer:    One Week        Signed By:  Jyoti Haines MD     May 1, 2022

## 2022-05-01 NOTE — DISCHARGE INSTRUCTIONS
POST DELIVERY DISCHARGE INSTRUCTIONS    Name: Lorin Gill  YOB: 1990  Primary Diagnosis: Active Problems:    * No active hospital problems. *      General:     Diet/Diet Restrictions:  · Eight 8-ounce glasses of fluid daily (water, juices); avoid excessive caffeine intake. · Meals/snacks as desired which are high in fiber and carbohydrates and low in fat and cholesterol. Medications:   {Medication reconciliation information is now added to the patient's AVS automatically when it is printed. There is no need to use this SmartLink in discharge instructions. Highlight this text and delete it to clear this message}      Physical Activity / Restrictions / Safety:     · Avoid heavy lifting, no more that 8 lbs. For 2-3 weeks;   · Limit use of stairs to 2 times daily for the first week home. · No driving for one week. · Avoid intercourse 4-6 weeks, no douching or tampon use. · Check with obstetrician before starting or resuming an exercise program.      Discharge Instructions/Special Treatment/Home Care Needs:     · Continue prenatal vitamins. · Continue to use squirt bottle with warm water on your episiotomy after each bathroom use until bleeding stops. · If steri-strips applied to your incision, remove in 7-10 days. Call your doctor for the following:     · Fever over 101 degrees by mouth. · Vaginal bleeding heavier than a normal menstrual period or clots larger than a golf ball. · Red streaks or increased swelling of legs, painful red streaks on your breast.  · Painful urination, constipation and increased pain or swelling or discharge with your incision. · If you feel extremely anxious or overwhelmed. · If you have thoughts of harming yourself and/or your baby. Pain Management:     · Take Acetaminophen (Tylenol) or Ibuprofen (Advil, Motrin), as directed for pain. · Use a warm Sitz bath 3 times daily to relieve episiotomy or hemorrhoidal discomfort.    · For hemorrhoidal discomfort, use Tucks and Anusol cream as needed and directed. · Heating pad to  incision as needed. Follow-Up Care:     Appointment with MD:   Follow-up Appointments   Procedures    FOLLOW UP VISIT Appointment in: One Week For mood and bp check     For mood and bp check     Standing Status:   Standing     Number of Occurrences:   1     Order Specific Question:   Appointment in     Answer: One Week     Telephone number: [phone]    Signed By: Marilee Estrada MD                                                                                                   Date: 2022 Time: 12:21 PM    Patient Education        Postpartum: Care Instructions  Overview  After childbirth (postpartum period), your body goes through many changes. Some of these changes happen over several weeks. In the hours after delivery, your body will begin to recover from childbirth while it prepares to breastfeed your . You may feel emotional during this time. Your hormones can shift your mood without warning for no clear reason. In the first couple of weeks after childbirth, it's common to have emotions that change from happy to sad. You may find it hard to sleep. You may cry a lot. This is called the \"baby blues. \" These overwhelming emotions often go away within a couple of days or weeks. But it's important to discuss your feelings with your doctor. It's easy to get too tired and overwhelmed during the first weeks after childbirth. Don't try to do too much. Get rest whenever you can, accept help from others, and eat well and drink plenty of fluids. In the first couple of weeks after you give birth, your doctor or midwife may want to check in with you and make a plan for any follow-up care you may need. You will likely have a complete postpartum visit in the first 3 months after delivery. At that time, your doctor or midwife will check on your recovery from childbirth and see how you're doing with your emotions. You may also discuss your concerns or questions. Follow-up care is a key part of your treatment and safety. Be sure to make and go to all appointments, and call your doctor if you are having problems. It's also a good idea to know your test results and keep a list of the medicines you take. How can you care for yourself at home? · Sleep or rest when your baby sleeps. · Get help with household chores from family or friends, if you can. Don't try to do it all yourself. · If you have hemorrhoids or swelling or pain around the opening of your vagina, try using cold and heat. You can put ice or a cold pack on the area for 10 to 20 minutes at a time. Put a thin cloth between the ice and your skin. Also try sitting in a few inches of warm water (sitz bath) 3 times a day and after bowel movements. · Take pain medicines exactly as directed. ? If the doctor gave you a prescription medicine for pain, take it as prescribed. ? If you are not taking a prescription pain medicine, ask your doctor if you can take an over-the-counter medicine. · Eat more fiber to avoid constipation. Include foods such as whole-grain breads and cereals, raw vegetables, raw and dried fruits, and beans. · Drink plenty of fluids. If you have kidney, heart, or liver disease and have to limit fluids, talk with your doctor before you increase the amount of fluids you drink. · Do not rinse inside your vagina with fluids (douche). · If you have stitches, keep the area clean by pouring or spraying warm water over the area outside your vagina and anus after you use the toilet. · Keep a list of questions to ask your doctor or midwife. Your questions might be about:  ? Changes in your breasts, such as lumps or soreness. ? When to expect your menstrual period to start again. ? What form of birth control is best for you. ? Weight you have put on during the pregnancy. ? Exercise options.   ? What foods and drinks are best for you, especially if you are breastfeeding. ? Problems you might be having with breastfeeding. ? When you can have sex. You may want to talk about lubricants for your vagina. ? Any feelings of sadness or restlessness that you are having. When should you call for help? Share this information with your partner, family, or a friend. They can help you watch for warning signs. Call 911  anytime you think you may need emergency care. For example, call if:    · You have thoughts of harming yourself, your baby, or another person.     · You passed out (lost consciousness).     · You have chest pain, are short of breath, or cough up blood.     · You have a seizure. Call your doctor now or seek immediate medical care if:    · You have signs of hemorrhage (too much bleeding), such as:  ? Heavy vaginal bleeding. This means that you are soaking through one or more pads in an hour. Or you pass blood clots bigger than an egg. ? Feeling dizzy or lightheaded, or you feel like you may faint. ? Feeling so tired or weak that you cannot do your usual activities. ? A fast or irregular heartbeat. ? New or worse belly pain.     · You have signs of infection, such as:  ? A fever. ? Vaginal discharge that smells bad.  ? New or worse belly pain.     · You have symptoms of a blood clot in your leg (called a deep vein thrombosis), such as:  ? Pain in the calf, back of the knee, thigh, or groin. ? Redness and swelling in your leg or groin.     · You have signs of preeclampsia, such as:  ? Sudden swelling of your face, hands, or feet. ? New vision problems (such as dimness, blurring, or seeing spots). ? A severe headache. Watch closely for changes in your health, and be sure to contact your doctor if:    · Your vaginal bleeding isn't decreasing.     · You feel sad, anxious, or hopeless for more than a few days.     · You are having problems with your breasts or breastfeeding. Where can you learn more?   Go to http://www.gray.com/  Enter E158 in the search box to learn more about \"Postpartum: Care Instructions. \"  Current as of: June 16, 2021               Content Version: 13.2  © 4061-2991 [employer]. Care instructions adapted under license by [employer] (which disclaims liability or warranty for this information). If you have questions about a medical condition or this instruction, always ask your healthcare professional. [place] [address] any warranty or liability for your use of this information.

## 2022-05-01 NOTE — ROUTINE PROCESS
Bedside and Verbal shift change report given to DION Todd (oncoming nurse) by Vita Mackey (offgoing nurse). Report included the following information SBAR, Kardex, Intake/Output, MAR, Recent Results and Med Rec Status.

## 2022-05-01 NOTE — PROGRESS NOTES
EPDS score of 11 notified to Dr. Zev Lorenz. Dr Zev Lorenz will discuss with the patient starting something for anxiety.

## 2022-05-01 NOTE — PROGRESS NOTES
Post-Operative  Day 3    Lauren Quiroz     Assessment: Post-Op day 3, stable    Plan:     - Routine post-operative care. Pain now controlled. - Postop hemoglobin stable at 10.2. Plan to start Fe at discharge. - hypothyroid - continue synthroid, follow up in 6 weeks  - depression/bipolar - continue buspar. Declines need to start zoloft. 1 week mood check  - preE without SF - bps normal post delivery. No meds. Labs normal on admit. Information for the patient's :  Ilya Benedict [[id number]]   , Low Transverse     Patient doing well without significant complaint. Tolerating diet. Hicks out. Ambulating. Vitals:  Visit Vitals  BP (!) 99/97 (BP 1 Location: Left upper arm, BP Patient Position: At rest)   Pulse 88   Temp 97.9 °F (36.6 °C)   Resp 18   Ht 5' 3\" (1.6 m)   Wt 121.6 kg (268 lb)   SpO2 97%   Breastfeeding Unknown   BMI 47.47 kg/m²     Temp (24hrs), Av.1 °F (36.7 °C), Min:97.6 °F (36.4 °C), Max:98.7 °F (37.1 °C)      Last 24hr Input/Output:  No intake or output data in the 24 hours ending 22 1216       Exam:     Patient without distress. Fundus firm, nontender per nursing fundal checks. Incision bandaged, clean, dry, intact. Perineum with normal lochia noted per nursing assessment. Lower extremities are negative for pathological edema.     Labs:   Lab Results   Component Value Date/Time    WBC 17.7 (H) 2022 04:18 AM    WBC 15.7 (H) 2022 03:38 PM    WBC 12.2 (H) 2022 04:46 AM    WBC 15.6 (H) 2022 10:57 AM    WBC 9.1 2015 05:40 PM    HGB 10.2 (L) 2022 04:18 AM    HGB 11.2 (L) 2022 03:38 PM    HGB 10.3 (L) 2022 04:46 AM    HGB 11.5 2022 10:57 AM    HGB 14.6 2015 05:40 PM    HCT 32.1 (L) 2022 04:18 AM    HCT 34.7 (L) 2022 03:38 PM    HCT 32.0 (L) 2022 04:46 AM    HCT 35.7 2022 10:57 AM    HCT 42.9 2015 05:40 PM    PLATELET 302 04/29/2022 04:18 AM    PLATELET 326 54/46/3572 03:38 PM    PLATELET 703 91/29/0208 04:46 AM    PLATELET 843 16/14/0920 10:57 AM    PLATELET 762 27/76/4606 05:40 PM       No results found for this or any previous visit (from the past 24 hour(s)).

## 2022-05-01 NOTE — ROUTINE PROCESS
Bedside and Verbal shift change report given to DION Barcenas RN (oncoming nurse) by Andres Kelly RN (offgoing nurse). Report included the following information SBAR, Procedure Summary, Intake/Output, MAR and Recent Results.

## 2022-05-03 ENCOUNTER — TELEPHONE (OUTPATIENT)
Dept: MOTHER INFANT UNIT | Age: 32
End: 2022-05-03

## 2022-05-03 NOTE — TELEPHONE ENCOUNTER
Mother states that she is offering baby the breast for most of the feedings. Baby is getting a supplement of EBM or formula. Mother is working on her supply and pumping with her [employer] breast pump. Her goal is to breastfeed and supplement with her own EBM. Mother is comfortable with her feeding plan and was given the opportunity to ask questions.

## (undated) DEVICE — SOLIDIFIER FLD 2OZ 1500CC N DISINF IN BTL DISP SAFESORB

## (undated) DEVICE — ADHESIVE TISS DERMA FLEX 0.7ML -- HIGH VISCOSITY

## (undated) DEVICE — C-SECTION II-LF: Brand: MEDLINE INDUSTRIES, INC.

## (undated) DEVICE — (D)PREP SKN CHLRAPRP APPL 26ML -- CONVERT TO ITEM 371833

## (undated) DEVICE — SOLUTION IV 1000ML 0.9% SOD CHL

## (undated) DEVICE — STERILE POLYISOPRENE POWDER-FREE SURGICAL GLOVES: Brand: PROTEXIS

## (undated) DEVICE — LARGE, DISPOSABLE ALEXIS O C-SECTION PROTECTOR - RETRACTOR: Brand: ALEXIS ® O C-SECTION PROTECTOR - RETRACTOR

## (undated) DEVICE — PENCIL ES L3M BTTN SWCH S STL HEX LOK BLDE ELECTRD HOLSTER

## (undated) DEVICE — SUTURE VCRL SZ 2-0 L36IN ABSRB VLT L36MM CT-1 1/2 CIR J345H

## (undated) DEVICE — STERILE POLYISOPRENE POWDER-FREE SURGICAL GLOVES WITH EMOLLIENT COATING: Brand: PROTEXIS

## (undated) DEVICE — MEDI-VAC NON-CONDUCTIVE SUCTION TUBING: Brand: CARDINAL HEALTH

## (undated) DEVICE — STRAP,POSITIONING,KNEE/BODY,FOAM,4X60": Brand: MEDLINE

## (undated) DEVICE — STAPLER SKIN SQ 30 ABSRB STPL DISP INSORB

## (undated) DEVICE — REM POLYHESIVE ADULT PATIENT RETURN ELECTRODE: Brand: VALLEYLAB

## (undated) DEVICE — SUTURE VCRL SZ 0 L36IN ABSRB VLT L40MM CT 1/2 CIR J358H

## (undated) DEVICE — SUTURE STRATAFIX SYMMETRIC SZ 1 L18IN ABSRB VLT CT1 L36CM SXPP1A404

## (undated) DEVICE — TIP CLEANER: Brand: VALLEYLAB

## (undated) DEVICE — 3000CC GUARDIAN II: Brand: GUARDIAN